# Patient Record
Sex: FEMALE | Race: OTHER | HISPANIC OR LATINO | URBAN - METROPOLITAN AREA
[De-identification: names, ages, dates, MRNs, and addresses within clinical notes are randomized per-mention and may not be internally consistent; named-entity substitution may affect disease eponyms.]

---

## 2019-05-23 PROBLEM — Z00.00 ENCOUNTER FOR PREVENTIVE HEALTH EXAMINATION: Status: ACTIVE | Noted: 2019-05-23

## 2019-10-31 ENCOUNTER — OUTPATIENT (OUTPATIENT)
Dept: OUTPATIENT SERVICES | Facility: HOSPITAL | Age: 39
LOS: 1 days | Discharge: HOME | End: 2019-10-31

## 2019-10-31 ENCOUNTER — LABORATORY RESULT (OUTPATIENT)
Age: 39
End: 2019-10-31

## 2019-10-31 ENCOUNTER — APPOINTMENT (OUTPATIENT)
Dept: UROGYNECOLOGY | Facility: CLINIC | Age: 39
End: 2019-10-31
Payer: COMMERCIAL

## 2019-10-31 VITALS
SYSTOLIC BLOOD PRESSURE: 133 MMHG | DIASTOLIC BLOOD PRESSURE: 83 MMHG | WEIGHT: 220 LBS | HEIGHT: 64 IN | HEART RATE: 80 BPM | BODY MASS INDEX: 37.56 KG/M2

## 2019-10-31 DIAGNOSIS — M79.10 MYALGIA, UNSPECIFIED SITE: ICD-10-CM

## 2019-10-31 DIAGNOSIS — R82.90 UNSPECIFIED ABNORMAL FINDINGS IN URINE: ICD-10-CM

## 2019-10-31 DIAGNOSIS — Z82.49 FAMILY HISTORY OF ISCHEMIC HEART DISEASE AND OTHER DISEASES OF THE CIRCULATORY SYSTEM: ICD-10-CM

## 2019-10-31 DIAGNOSIS — Z78.9 OTHER SPECIFIED HEALTH STATUS: ICD-10-CM

## 2019-10-31 DIAGNOSIS — Z83.79 FAMILY HISTORY OF OTHER DISEASES OF THE DIGESTIVE SYSTEM: ICD-10-CM

## 2019-10-31 DIAGNOSIS — N81.2 INCOMPLETE UTEROVAGINAL PROLAPSE: ICD-10-CM

## 2019-10-31 LAB
BILIRUB UR QL STRIP: NORMAL
CLARITY UR: CLEAR
COLLECTION METHOD: NORMAL
GLUCOSE UR-MCNC: NORMAL
HCG UR QL: NORMAL EU/DL
HGB UR QL STRIP.AUTO: 10
KETONES UR-MCNC: NORMAL
LEUKOCYTE ESTERASE UR QL STRIP: 25
NITRITE UR QL STRIP: NORMAL
PH UR STRIP: 6
PROT UR STRIP-MCNC: NORMAL
SP GR UR STRIP: 1.01

## 2019-10-31 PROCEDURE — 51701 INSERT BLADDER CATHETER: CPT

## 2019-10-31 PROCEDURE — 99244 OFF/OP CNSLTJ NEW/EST MOD 40: CPT | Mod: 25

## 2019-10-31 PROCEDURE — 81003 URINALYSIS AUTO W/O SCOPE: CPT | Mod: QW

## 2019-10-31 RX ORDER — CYCLOBENZAPRINE HYDROCHLORIDE 5 MG/1
5 TABLET, FILM COATED ORAL
Qty: 60 | Refills: 1 | Status: ACTIVE | COMMUNITY
Start: 2019-10-31 | End: 1900-01-01

## 2019-10-31 RX ORDER — PRENATAL WITH FERROUS FUM AND FOLIC ACID 3080; 920; 120; 400; 22; 1.84; 3; 20; 10; 1; 12; 200; 27; 25; 2 [IU]/1; [IU]/1; MG/1; [IU]/1; MG/1; MG/1; MG/1; MG/1; MG/1; MG/1; UG/1; MG/1; MG/1; MG/1; MG/1
27-1 TABLET ORAL
Qty: 30 | Refills: 0 | Status: ACTIVE | COMMUNITY
Start: 2019-10-14

## 2019-10-31 RX ORDER — COPPER 313.4 MG/1
INTRAUTERINE DEVICE INTRAUTERINE
Refills: 0 | Status: ACTIVE | COMMUNITY

## 2019-10-31 NOTE — HISTORY OF PRESENT ILLNESS
[FreeTextEntry1] : \par Pt with pelvic floor dysfunction here for urogynecologic evaluation. 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, etc\par \par Pelvic organ prolapse: s/p odessa, +bulge for 2 years, since her last delivery, not worsening, s/p kegels, denies splinting\par Stress urinary incontinence: none\par Overactive bladder syndrome: none\par Voiding dysfunction: No incomplete bladder emptying, no hesitancy \par Lower urinary tract/vaginal symptoms: 0 UTIs per year, no hematuria, no dysuria, no bladder pain \par Fecal incontinence: none\par Defecatory dysfunction: sausage \par Sexual dysfunction: Pain with penetration and at the opening for the last 6 months, incontinence with penetration x1\par Pelvic pain: none\par Vaginal dryness: no \par \par Her pelvic floor symptoms are significantly bothersome and negatively impacting her quality of life. \par \par

## 2019-10-31 NOTE — DISCUSSION/SUMMARY
[FreeTextEntry1] : \par Uterovaginal prolapse incomplete-\par The patient was counseled regarding the possible natural progression of prolapse and the clinical consequences of worsening prolapse. The stage and the location of the prolapse was reviewed with the patient. She was counseled regarding the management strategies including observation, pessary placement and surgery (robotic hysterectomy/BS/sacrocolpopexy). The patient voiced understanding and agrees with pessary management. The patient will be scheduled for a pessary fitting.\par \par Myalgia-\par Discussed the pathophysiology of the above condition. Reviewed management options including medications (oral or vaginal suppository), injections, pelvic floor physical therapy, or referral for possible pudendal nerve blocks. The patient agrees to a referral to PT and medical management. The risks and benefits of flexeril was reviewed.\par \par Abnormal urinalysis-\par Will send the urine for a culture to rule out infectious etiology. \par \par \par \par

## 2019-10-31 NOTE — COUNSELING
[FreeTextEntry1] : \par We will notify you of the urine results if they are abnormal\par \par Schedule pessary fitting with my PASean\par \par Please start taking the flexeril (muscle relaxant) twice a day for the pain. It can make you sleepy\par \par Please call my office if you have any issues with the cost or side effects of the medication.\par \par Schedule 6 week med check with my PASean (myalgia)\par \par Referral to pelvic floor PT\par [facility]\par [address] [place] [id number]\par \par \par [facility]\par [place], [place]\par \par

## 2019-11-04 DIAGNOSIS — R82.90 UNSPECIFIED ABNORMAL FINDINGS IN URINE: ICD-10-CM

## 2019-11-18 ENCOUNTER — APPOINTMENT (OUTPATIENT)
Dept: UROGYNECOLOGY | Facility: CLINIC | Age: 39
End: 2019-11-18

## 2019-12-12 ENCOUNTER — APPOINTMENT (OUTPATIENT)
Dept: UROGYNECOLOGY | Facility: CLINIC | Age: 39
End: 2019-12-12

## 2020-12-09 ENCOUNTER — APPOINTMENT (OUTPATIENT)
Dept: SURGERY | Facility: CLINIC | Age: 40
End: 2020-12-09
Payer: COMMERCIAL

## 2020-12-09 VITALS
WEIGHT: 285 LBS | BODY MASS INDEX: 52.44 KG/M2 | DIASTOLIC BLOOD PRESSURE: 70 MMHG | HEIGHT: 62 IN | SYSTOLIC BLOOD PRESSURE: 126 MMHG

## 2020-12-09 PROCEDURE — 99072 ADDL SUPL MATRL&STAF TM PHE: CPT

## 2020-12-09 PROCEDURE — 99204 OFFICE O/P NEW MOD 45 MIN: CPT

## 2020-12-17 NOTE — PHYSICAL EXAM
[Obese, well nourished, in no acute distress] : obese, well nourished, in no acute distress [Normal] : affect appropriate [de-identified] : soft, non-tender, no rebound/guarding, no masses/hernias, no LAD, well-healed laparoscopic scars [de-identified] : no CVA tenderness

## 2020-12-17 NOTE — ASSESSMENT
[FreeTextEntry1] : 41yo female with PMHx of asthma, pre-DM (uncertain of A1C), previous cholecystectomy, menorrhagia (resolved with IUD) and morbid obesity BMI 52.1 presenting for consultation of weight loss surgery/management.\par -discussed surgical options - gastric band, sleeve gastrectomy, yaw-en-Y gastric bypass\par -discussed potential surgical complications for each option - including bleeding, infection, pain, hernia, leak, stricture, and blood clots\par -discussed smoking of any kind (cigarettes, marijuana, e-cigarettes) is prohibited\par -discussed that pregnancy within 2 years is not recommended - patient has IUD, not planning on more children, has 15yo and 2yo sons\par -at this time, the patient is interested in SLEEVE GASTRECTOMY\par -I informed him that there may be findings on EGD that disqualify him from sleeve, particularly cordova's esophagus.\par -recommend high protein, low carb, low fat diet with protein shakes\par -encourage exercise regimen - starting with 10 minutes per day combining cardio and resistance training with the goal of 30 minutes of exercise 4 times per week\par -next step - bariatric education session at nearest convenience \par -pre-operative preparation - will include PCP evaluation, cardiac evaluation, pulmonary evaluation, EGD, nutritional evaluation (3 months), labs and US

## 2020-12-17 NOTE — HISTORY OF PRESENT ILLNESS
[de-identified] : 41yo female with PMHx of asthma, pre-DM (uncertain of A1C), previous cholecystectomy, menorrhagia (resolved with IUD) and morbid obesity BMI 52.1 presenting for consultation of weight loss surgery/management. Patient states she has struggled with her weight since having children. Previous weight loss attempts include various diets and exercise regimens with limited success. Patient denies shortness of breath. She reports lower back and knee pain, which she attributes to her weight. She states that she has occasional heartburn related to eating fatty foods, which is self-limited. Underwent EGD 4 years ago, which was negative. Underwent colonoscopy at that time for diverticulitis.

## 2020-12-17 NOTE — CONSULT LETTER
[Courtesy Letter:] : I had the pleasure of seeing your patient, [unfilled], in my office today. [Please see my note below.] : Please see my note below. [Sincerely,] : Sincerely, [Dear  ___] : Dear  [unfilled], [FreeTextEntry3] : Linda Hodge MD FACS\par Bariatric & Minimally Invasive Surgery\par [facility]\par [phone]

## 2021-01-06 ENCOUNTER — APPOINTMENT (OUTPATIENT)
Dept: SURGERY | Facility: CLINIC | Age: 41
End: 2021-01-06

## 2021-01-16 ENCOUNTER — OUTPATIENT (OUTPATIENT)
Dept: OUTPATIENT SERVICES | Facility: HOSPITAL | Age: 41
LOS: 1 days | Discharge: HOME | End: 2021-01-16

## 2021-01-16 DIAGNOSIS — F50.9 EATING DISORDER, UNSPECIFIED: ICD-10-CM

## 2021-01-21 DIAGNOSIS — F50.9 EATING DISORDER, UNSPECIFIED: ICD-10-CM

## 2021-02-08 ENCOUNTER — APPOINTMENT (OUTPATIENT)
Dept: SURGERY | Facility: CLINIC | Age: 41
End: 2021-02-08

## 2021-03-18 ENCOUNTER — APPOINTMENT (OUTPATIENT)
Dept: SURGERY | Facility: CLINIC | Age: 41
End: 2021-03-18
Payer: COMMERCIAL

## 2021-03-18 VITALS — BODY MASS INDEX: 48.4 KG/M2 | HEIGHT: 62 IN | WEIGHT: 263 LBS

## 2021-03-18 PROCEDURE — ZZZZZ: CPT

## 2021-03-24 DIAGNOSIS — E55.9 VITAMIN D DEFICIENCY, UNSPECIFIED: ICD-10-CM

## 2021-04-29 ENCOUNTER — APPOINTMENT (OUTPATIENT)
Dept: SURGERY | Facility: CLINIC | Age: 41
End: 2021-04-29
Payer: COMMERCIAL

## 2021-04-29 VITALS — WEIGHT: 258 LBS | BODY MASS INDEX: 47.48 KG/M2 | HEIGHT: 62 IN

## 2021-04-29 PROCEDURE — ZZZZZ: CPT

## 2021-05-10 ENCOUNTER — APPOINTMENT (OUTPATIENT)
Dept: SURGERY | Facility: CLINIC | Age: 41
End: 2021-05-10
Payer: COMMERCIAL

## 2021-05-10 VITALS — BODY MASS INDEX: 47.48 KG/M2 | WEIGHT: 258 LBS | HEIGHT: 62 IN

## 2021-05-10 PROCEDURE — ZZZZZ: CPT

## 2021-06-21 ENCOUNTER — APPOINTMENT (OUTPATIENT)
Dept: SURGERY | Facility: CLINIC | Age: 41
End: 2021-06-21
Payer: COMMERCIAL

## 2021-06-21 VITALS — HEIGHT: 62 IN | BODY MASS INDEX: 46.38 KG/M2 | WEIGHT: 252 LBS

## 2021-06-21 PROCEDURE — ZZZZZ: CPT

## 2021-11-04 ENCOUNTER — NON-APPOINTMENT (OUTPATIENT)
Age: 41
End: 2021-11-04

## 2021-12-08 ENCOUNTER — APPOINTMENT (OUTPATIENT)
Dept: NEUROPSYCHOLOGY | Facility: CLINIC | Age: 41
End: 2021-12-08

## 2021-12-08 ENCOUNTER — OUTPATIENT (OUTPATIENT)
Dept: OUTPATIENT SERVICES | Facility: HOSPITAL | Age: 41
LOS: 1 days | Discharge: HOME | End: 2021-12-08

## 2021-12-08 DIAGNOSIS — F50.9 EATING DISORDER, UNSPECIFIED: ICD-10-CM

## 2021-12-14 DIAGNOSIS — F50.9 EATING DISORDER, UNSPECIFIED: ICD-10-CM

## 2021-12-20 ENCOUNTER — NON-APPOINTMENT (OUTPATIENT)
Age: 41
End: 2021-12-20

## 2022-01-11 ENCOUNTER — NON-APPOINTMENT (OUTPATIENT)
Age: 42
End: 2022-01-11

## 2022-01-11 RX ORDER — NORETHINDRONE ACETATE 5 MG/1
5 TABLET ORAL
Qty: 20 | Refills: 0 | Status: DISCONTINUED | COMMUNITY
Start: 2020-12-04 | End: 2022-01-11

## 2022-02-01 ENCOUNTER — OUTPATIENT (OUTPATIENT)
Dept: OUTPATIENT SERVICES | Facility: HOSPITAL | Age: 42
LOS: 1 days | Discharge: HOME | End: 2022-02-01
Payer: COMMERCIAL

## 2022-02-01 VITALS
WEIGHT: 282.19 LBS | SYSTOLIC BLOOD PRESSURE: 138 MMHG | HEIGHT: 63 IN | DIASTOLIC BLOOD PRESSURE: 69 MMHG | TEMPERATURE: 98 F | RESPIRATION RATE: 18 BRPM | OXYGEN SATURATION: 99 % | HEART RATE: 82 BPM

## 2022-02-01 DIAGNOSIS — Z01.818 ENCOUNTER FOR OTHER PREPROCEDURAL EXAMINATION: ICD-10-CM

## 2022-02-01 DIAGNOSIS — Z98.890 OTHER SPECIFIED POSTPROCEDURAL STATES: Chronic | ICD-10-CM

## 2022-02-01 DIAGNOSIS — E66.01 MORBID (SEVERE) OBESITY DUE TO EXCESS CALORIES: ICD-10-CM

## 2022-02-01 LAB
ALBUMIN SERPL ELPH-MCNC: 4.1 G/DL — SIGNIFICANT CHANGE UP (ref 3.5–5.2)
ALP SERPL-CCNC: 116 U/L — HIGH (ref 30–115)
ALT FLD-CCNC: 17 U/L — SIGNIFICANT CHANGE UP (ref 0–41)
ANION GAP SERPL CALC-SCNC: 14 MMOL/L — SIGNIFICANT CHANGE UP (ref 7–14)
APTT BLD: 37.7 SEC — SIGNIFICANT CHANGE UP (ref 27–39.2)
AST SERPL-CCNC: 15 U/L — SIGNIFICANT CHANGE UP (ref 0–41)
BASOPHILS # BLD AUTO: 0.03 K/UL — SIGNIFICANT CHANGE UP (ref 0–0.2)
BASOPHILS NFR BLD AUTO: 0.3 % — SIGNIFICANT CHANGE UP (ref 0–1)
BILIRUB SERPL-MCNC: <0.2 MG/DL — SIGNIFICANT CHANGE UP (ref 0.2–1.2)
BLD GP AB SCN SERPL QL: SIGNIFICANT CHANGE UP
BUN SERPL-MCNC: 15 MG/DL — SIGNIFICANT CHANGE UP (ref 10–20)
CALCIUM SERPL-MCNC: 9.3 MG/DL — SIGNIFICANT CHANGE UP (ref 8.5–10.1)
CHLORIDE SERPL-SCNC: 102 MMOL/L — SIGNIFICANT CHANGE UP (ref 98–110)
CO2 SERPL-SCNC: 22 MMOL/L — SIGNIFICANT CHANGE UP (ref 17–32)
CREAT SERPL-MCNC: 0.7 MG/DL — SIGNIFICANT CHANGE UP (ref 0.7–1.5)
EOSINOPHIL # BLD AUTO: 0.18 K/UL — SIGNIFICANT CHANGE UP (ref 0–0.7)
EOSINOPHIL NFR BLD AUTO: 1.8 % — SIGNIFICANT CHANGE UP (ref 0–8)
GLUCOSE SERPL-MCNC: 78 MG/DL — SIGNIFICANT CHANGE UP (ref 70–99)
HCT VFR BLD CALC: 41.8 % — SIGNIFICANT CHANGE UP (ref 37–47)
HGB BLD-MCNC: 13.2 G/DL — SIGNIFICANT CHANGE UP (ref 12–16)
IMM GRANULOCYTES NFR BLD AUTO: 0.3 % — SIGNIFICANT CHANGE UP (ref 0.1–0.3)
INR BLD: 1.1 RATIO — SIGNIFICANT CHANGE UP (ref 0.65–1.3)
LYMPHOCYTES # BLD AUTO: 2.65 K/UL — SIGNIFICANT CHANGE UP (ref 1.2–3.4)
LYMPHOCYTES # BLD AUTO: 26.3 % — SIGNIFICANT CHANGE UP (ref 20.5–51.1)
MCHC RBC-ENTMCNC: 27.2 PG — SIGNIFICANT CHANGE UP (ref 27–31)
MCHC RBC-ENTMCNC: 31.6 G/DL — LOW (ref 32–37)
MCV RBC AUTO: 86.2 FL — SIGNIFICANT CHANGE UP (ref 81–99)
MONOCYTES # BLD AUTO: 0.71 K/UL — HIGH (ref 0.1–0.6)
MONOCYTES NFR BLD AUTO: 7.1 % — SIGNIFICANT CHANGE UP (ref 1.7–9.3)
NEUTROPHILS # BLD AUTO: 6.46 K/UL — SIGNIFICANT CHANGE UP (ref 1.4–6.5)
NEUTROPHILS NFR BLD AUTO: 64.2 % — SIGNIFICANT CHANGE UP (ref 42.2–75.2)
NRBC # BLD: 0 /100 WBCS — SIGNIFICANT CHANGE UP (ref 0–0)
PLATELET # BLD AUTO: 303 K/UL — SIGNIFICANT CHANGE UP (ref 130–400)
POTASSIUM SERPL-MCNC: 4.4 MMOL/L — SIGNIFICANT CHANGE UP (ref 3.5–5)
POTASSIUM SERPL-SCNC: 4.4 MMOL/L — SIGNIFICANT CHANGE UP (ref 3.5–5)
PROT SERPL-MCNC: 6.9 G/DL — SIGNIFICANT CHANGE UP (ref 6–8)
PROTHROM AB SERPL-ACNC: 12.6 SEC — SIGNIFICANT CHANGE UP (ref 9.95–12.87)
RBC # BLD: 4.85 M/UL — SIGNIFICANT CHANGE UP (ref 4.2–5.4)
RBC # FLD: 14.6 % — HIGH (ref 11.5–14.5)
SODIUM SERPL-SCNC: 138 MMOL/L — SIGNIFICANT CHANGE UP (ref 135–146)
WBC # BLD: 10.06 K/UL — SIGNIFICANT CHANGE UP (ref 4.8–10.8)
WBC # FLD AUTO: 10.06 K/UL — SIGNIFICANT CHANGE UP (ref 4.8–10.8)

## 2022-02-01 PROCEDURE — 93010 ELECTROCARDIOGRAM REPORT: CPT

## 2022-02-01 NOTE — H&P PST ADULT - HISTORY OF PRESENT ILLNESS
Patient is a 41 year old female presenting to PAST in preparation for Laparoscopic sleeve gastrectomy, possible open, possible intraoperative endoscopy and all indicated procedures- admission to ICU/SDU  on 2/22 under gen anesthesia by Dr. Hodge  pt is having procedure electively  PATIENT CURRENTLY DENIES CHEST PAIN  SHORTNESS OF BREATH  PALPITATIONS,  DYSURIA, OR UPPER RESPIRATORY INFECTION IN PAST 2 WEEKS  EXERCISE  TOLERANCE  1-2 FLIGHT OF STAIRS  WITHOUT SHORTNESS OF BREATH    Anesthesia Alert  NO--Difficult Airway  NO--History of neck surgery or radiation  NO--Limited ROM of neck  NO--History of Malignant hyperthermia  NO--Personal or family history of Pseudocholinesterase deficiency  NO--Prior Anesthesia Complication  NO--Latex Allergy  NO--Loose teeth  NO--History of Rheumatoid Arthritis  yes--NETTIE ( does not have c-pap)  NO-- BLEEDING RISK  NO--Other_____    Patient denies any signs or symptoms of COVID 19 and denies contact with known positive individuals.  They have an appointment for  COVID testing pre-procedure and acknowledge its time and place.  They were instructed to quarantine pre-procedure, practice exposure control measures, continue to self-monitor and report any concerns to their proceduralist.    As per patient, this is their complete medical and surgical history, including medications both prescribed or over the counter.  Patient verbalized understanding of instructions and was given the opportunity to ask questions and have them answered.

## 2022-02-02 LAB
A1C WITH ESTIMATED AVERAGE GLUCOSE RESULT: 6.1 % — HIGH (ref 4–5.6)
ESTIMATED AVERAGE GLUCOSE: 128 MG/DL — HIGH (ref 68–114)

## 2022-02-15 RX ORDER — OMEPRAZOLE 40 MG/1
40 CAPSULE, DELAYED RELEASE ORAL
Qty: 30 | Refills: 2 | Status: COMPLETED | COMMUNITY
Start: 2022-02-15 | End: 2022-05-23

## 2022-02-15 RX ORDER — ERGOCALCIFEROL 1.25 MG/1
1.25 MG CAPSULE, LIQUID FILLED ORAL
Qty: 4 | Refills: 3 | Status: COMPLETED | COMMUNITY
Start: 2021-03-24 | End: 2022-02-15

## 2022-02-16 ENCOUNTER — APPOINTMENT (OUTPATIENT)
Dept: SURGERY | Facility: CLINIC | Age: 42
End: 2022-02-16
Payer: COMMERCIAL

## 2022-02-16 VITALS
TEMPERATURE: 97.2 F | HEIGHT: 62 IN | OXYGEN SATURATION: 98 % | SYSTOLIC BLOOD PRESSURE: 110 MMHG | WEIGHT: 278 LBS | HEART RATE: 86 BPM | DIASTOLIC BLOOD PRESSURE: 80 MMHG | BODY MASS INDEX: 51.16 KG/M2

## 2022-02-16 DIAGNOSIS — J45.909 UNSPECIFIED ASTHMA, UNCOMPLICATED: ICD-10-CM

## 2022-02-16 PROCEDURE — 99242 OFF/OP CONSLTJ NEW/EST SF 20: CPT

## 2022-02-17 NOTE — PLAN
[FreeTextEntry1] : Plan: Proceed with surgery on 2/22/222.\par          RTO for postoperative visit on 3/2/2022.\par          Call with concerns.\par

## 2022-02-17 NOTE — HISTORY OF PRESENT ILLNESS
[de-identified] : BETSY LUCERO  underwent extensive preoperative workup and is scheduled to have Robotic/Laparoscopic Sleeve Gastrectomy on 2/22/2022. Patient will benefit from surgery to improve her quality of life and for management of her comorbid conditions.\par At this preoperative visit, we discussed the risks, benefits and alternatives to weight loss surgery. We specifically discussed the risks of anesthesia including cardiac and pulmonary complications, DVT/PE, pneumonia, leaks, infection, death, bleeding, ulcers, and need for additional operations have been reviewed. We discussed the importance of a consistent diet and exercise regimen in regards to weight loss and maintenance as well. The importance of lifelong mineral and vitamin supplementation was also reviewed with the patient. The patient was given ample opportunity to ask questions and all questions were answered\par

## 2022-02-17 NOTE — ADDENDUM
[FreeTextEntry1] : I have seen and examined the patient and participated in patient care. Patient is preparing for robotic sleeve gastrectomy 2/22/22.

## 2022-02-17 NOTE — ASSESSMENT
[FreeTextEntry1] : BETSY LUCERO is a 41 year female seen today for Preoperative Bariatric visit. She was prescribed Lovenox 40 mg SQ daily prophylactically for 1 month.She was instructed in the administration of the medication with good return demonstration.  All medications were reviewed with patient and instructions were given in respect to medications to take on the day of surgery as well as postoperatively.  Written instructions and materials were provided.  All questions were answered.\par

## 2022-02-18 ENCOUNTER — NON-APPOINTMENT (OUTPATIENT)
Age: 42
End: 2022-02-18

## 2022-02-19 ENCOUNTER — LABORATORY RESULT (OUTPATIENT)
Age: 42
End: 2022-02-19

## 2022-02-22 ENCOUNTER — APPOINTMENT (OUTPATIENT)
Dept: SURGERY | Facility: HOSPITAL | Age: 42
End: 2022-02-22

## 2022-02-22 ENCOUNTER — RESULT REVIEW (OUTPATIENT)
Age: 42
End: 2022-02-22

## 2022-02-22 ENCOUNTER — INPATIENT (INPATIENT)
Facility: HOSPITAL | Age: 42
LOS: 0 days | Discharge: HOME | End: 2022-02-23
Attending: STUDENT IN AN ORGANIZED HEALTH CARE EDUCATION/TRAINING PROGRAM | Admitting: STUDENT IN AN ORGANIZED HEALTH CARE EDUCATION/TRAINING PROGRAM
Payer: COMMERCIAL

## 2022-02-22 VITALS
SYSTOLIC BLOOD PRESSURE: 123 MMHG | DIASTOLIC BLOOD PRESSURE: 71 MMHG | WEIGHT: 278 LBS | TEMPERATURE: 98 F | RESPIRATION RATE: 16 BRPM | HEIGHT: 63 IN | OXYGEN SATURATION: 96 % | HEART RATE: 87 BPM

## 2022-02-22 DIAGNOSIS — Z98.890 OTHER SPECIFIED POSTPROCEDURAL STATES: Chronic | ICD-10-CM

## 2022-02-22 LAB
ANION GAP SERPL CALC-SCNC: 15 MMOL/L — HIGH (ref 7–14)
BASOPHILS # BLD AUTO: 0.02 K/UL — SIGNIFICANT CHANGE UP (ref 0–0.2)
BASOPHILS NFR BLD AUTO: 0.2 % — SIGNIFICANT CHANGE UP (ref 0–1)
BUN SERPL-MCNC: 10 MG/DL — SIGNIFICANT CHANGE UP (ref 10–20)
CALCIUM SERPL-MCNC: 8.3 MG/DL — LOW (ref 8.5–10.1)
CHLORIDE SERPL-SCNC: 102 MMOL/L — SIGNIFICANT CHANGE UP (ref 98–110)
CO2 SERPL-SCNC: 20 MMOL/L — SIGNIFICANT CHANGE UP (ref 17–32)
CREAT SERPL-MCNC: 0.7 MG/DL — SIGNIFICANT CHANGE UP (ref 0.7–1.5)
EOSINOPHIL # BLD AUTO: 0.01 K/UL — SIGNIFICANT CHANGE UP (ref 0–0.7)
EOSINOPHIL NFR BLD AUTO: 0.1 % — SIGNIFICANT CHANGE UP (ref 0–8)
GLUCOSE SERPL-MCNC: 146 MG/DL — HIGH (ref 70–99)
HCT VFR BLD CALC: 42.7 % — SIGNIFICANT CHANGE UP (ref 37–47)
HGB BLD-MCNC: 13.8 G/DL — SIGNIFICANT CHANGE UP (ref 12–16)
IMM GRANULOCYTES NFR BLD AUTO: 0.4 % — HIGH (ref 0.1–0.3)
LYMPHOCYTES # BLD AUTO: 1.01 K/UL — LOW (ref 1.2–3.4)
LYMPHOCYTES # BLD AUTO: 7.6 % — LOW (ref 20.5–51.1)
MAGNESIUM SERPL-MCNC: 1.7 MG/DL — LOW (ref 1.8–2.4)
MCHC RBC-ENTMCNC: 27.6 PG — SIGNIFICANT CHANGE UP (ref 27–31)
MCHC RBC-ENTMCNC: 32.3 G/DL — SIGNIFICANT CHANGE UP (ref 32–37)
MCV RBC AUTO: 85.4 FL — SIGNIFICANT CHANGE UP (ref 81–99)
MONOCYTES # BLD AUTO: 0.18 K/UL — SIGNIFICANT CHANGE UP (ref 0.1–0.6)
MONOCYTES NFR BLD AUTO: 1.4 % — LOW (ref 1.7–9.3)
NEUTROPHILS # BLD AUTO: 12.01 K/UL — HIGH (ref 1.4–6.5)
NEUTROPHILS NFR BLD AUTO: 90.3 % — HIGH (ref 42.2–75.2)
NRBC # BLD: 0 /100 WBCS — SIGNIFICANT CHANGE UP (ref 0–0)
PHOSPHATE SERPL-MCNC: 2.9 MG/DL — SIGNIFICANT CHANGE UP (ref 2.1–4.9)
PLATELET # BLD AUTO: 282 K/UL — SIGNIFICANT CHANGE UP (ref 130–400)
POTASSIUM SERPL-MCNC: 4.2 MMOL/L — SIGNIFICANT CHANGE UP (ref 3.5–5)
POTASSIUM SERPL-SCNC: 4.2 MMOL/L — SIGNIFICANT CHANGE UP (ref 3.5–5)
RBC # BLD: 5 M/UL — SIGNIFICANT CHANGE UP (ref 4.2–5.4)
RBC # FLD: 14.7 % — HIGH (ref 11.5–14.5)
SODIUM SERPL-SCNC: 137 MMOL/L — SIGNIFICANT CHANGE UP (ref 135–146)
WBC # BLD: 13.28 K/UL — HIGH (ref 4.8–10.8)
WBC # FLD AUTO: 13.28 K/UL — HIGH (ref 4.8–10.8)

## 2022-02-22 PROCEDURE — 43775 LAP SLEEVE GASTRECTOMY: CPT

## 2022-02-22 PROCEDURE — 88305 TISSUE EXAM BY PATHOLOGIST: CPT | Mod: 26

## 2022-02-22 PROCEDURE — 93010 ELECTROCARDIOGRAM REPORT: CPT

## 2022-02-22 RX ORDER — ONDANSETRON 8 MG/1
4 TABLET, FILM COATED ORAL EVERY 6 HOURS
Refills: 0 | Status: DISCONTINUED | OUTPATIENT
Start: 2022-02-22 | End: 2022-02-23

## 2022-02-22 RX ORDER — ACETAMINOPHEN 500 MG
1000 TABLET ORAL ONCE
Refills: 0 | Status: COMPLETED | OUTPATIENT
Start: 2022-02-22 | End: 2022-02-22

## 2022-02-22 RX ORDER — SODIUM CHLORIDE 9 MG/ML
1000 INJECTION, SOLUTION INTRAVENOUS
Refills: 0 | Status: DISCONTINUED | OUTPATIENT
Start: 2022-02-22 | End: 2022-02-22

## 2022-02-22 RX ORDER — PANTOPRAZOLE SODIUM 20 MG/1
40 TABLET, DELAYED RELEASE ORAL DAILY
Refills: 0 | Status: DISCONTINUED | OUTPATIENT
Start: 2022-02-23 | End: 2022-02-23

## 2022-02-22 RX ORDER — ONDANSETRON 8 MG/1
4 TABLET, FILM COATED ORAL ONCE
Refills: 0 | Status: COMPLETED | OUTPATIENT
Start: 2022-02-22 | End: 2022-02-22

## 2022-02-22 RX ORDER — HYDROMORPHONE HYDROCHLORIDE 2 MG/ML
1 INJECTION INTRAMUSCULAR; INTRAVENOUS; SUBCUTANEOUS
Refills: 0 | Status: DISCONTINUED | OUTPATIENT
Start: 2022-02-22 | End: 2022-02-22

## 2022-02-22 RX ORDER — ENOXAPARIN SODIUM 100 MG/ML
40 INJECTION SUBCUTANEOUS ONCE
Refills: 0 | Status: COMPLETED | OUTPATIENT
Start: 2022-02-22 | End: 2022-02-22

## 2022-02-22 RX ORDER — GABAPENTIN 400 MG/1
100 CAPSULE ORAL THREE TIMES A DAY
Refills: 0 | Status: DISCONTINUED | OUTPATIENT
Start: 2022-02-22 | End: 2022-02-23

## 2022-02-22 RX ORDER — SCOPALAMINE 1 MG/3D
1 PATCH, EXTENDED RELEASE TRANSDERMAL ONCE
Refills: 0 | Status: COMPLETED | OUTPATIENT
Start: 2022-02-22 | End: 2022-02-22

## 2022-02-22 RX ORDER — ENOXAPARIN SODIUM 100 MG/ML
40 INJECTION SUBCUTANEOUS EVERY 12 HOURS
Refills: 0 | Status: DISCONTINUED | OUTPATIENT
Start: 2022-02-23 | End: 2022-02-23

## 2022-02-22 RX ORDER — BUPIVACAINE 13.3 MG/ML
20 INJECTION, SUSPENSION, LIPOSOMAL INFILTRATION ONCE
Refills: 0 | Status: DISCONTINUED | OUTPATIENT
Start: 2022-02-22 | End: 2022-02-22

## 2022-02-22 RX ORDER — KETOROLAC TROMETHAMINE 30 MG/ML
15 SYRINGE (ML) INJECTION EVERY 8 HOURS
Refills: 0 | Status: DISCONTINUED | OUTPATIENT
Start: 2022-02-22 | End: 2022-02-23

## 2022-02-22 RX ORDER — SODIUM CHLORIDE 9 MG/ML
1000 INJECTION, SOLUTION INTRAVENOUS
Refills: 0 | Status: DISCONTINUED | OUTPATIENT
Start: 2022-02-22 | End: 2022-02-23

## 2022-02-22 RX ORDER — DIPHENHYDRAMINE HCL 50 MG
12.5 CAPSULE ORAL ONCE
Refills: 0 | Status: COMPLETED | OUTPATIENT
Start: 2022-02-22 | End: 2022-02-22

## 2022-02-22 RX ORDER — MAGNESIUM SULFATE 500 MG/ML
2 VIAL (ML) INJECTION ONCE
Refills: 0 | Status: COMPLETED | OUTPATIENT
Start: 2022-02-22 | End: 2022-02-22

## 2022-02-22 RX ORDER — ACETAMINOPHEN 500 MG
1000 TABLET ORAL ONCE
Refills: 0 | Status: DISCONTINUED | OUTPATIENT
Start: 2022-02-22 | End: 2022-02-22

## 2022-02-22 RX ADMIN — SODIUM CHLORIDE 125 MILLILITER(S): 9 INJECTION, SOLUTION INTRAVENOUS at 18:27

## 2022-02-22 RX ADMIN — HYDROMORPHONE HYDROCHLORIDE 1 MILLIGRAM(S): 2 INJECTION INTRAMUSCULAR; INTRAVENOUS; SUBCUTANEOUS at 17:37

## 2022-02-22 RX ADMIN — SODIUM CHLORIDE 75 MILLILITER(S): 9 INJECTION, SOLUTION INTRAVENOUS at 17:46

## 2022-02-22 RX ADMIN — Medication 400 MILLIGRAM(S): at 22:23

## 2022-02-22 RX ADMIN — SCOPALAMINE 1 PATCH: 1 PATCH, EXTENDED RELEASE TRANSDERMAL at 18:26

## 2022-02-22 RX ADMIN — Medication 15 MILLIGRAM(S): at 19:44

## 2022-02-22 RX ADMIN — ENOXAPARIN SODIUM 40 MILLIGRAM(S): 100 INJECTION SUBCUTANEOUS at 13:34

## 2022-02-22 RX ADMIN — Medication 25 GRAM(S): at 23:01

## 2022-02-22 RX ADMIN — Medication 12.5 MILLIGRAM(S): at 18:39

## 2022-02-22 RX ADMIN — ONDANSETRON 4 MILLIGRAM(S): 8 TABLET, FILM COATED ORAL at 17:42

## 2022-02-22 RX ADMIN — SCOPALAMINE 1 PATCH: 1 PATCH, EXTENDED RELEASE TRANSDERMAL at 13:34

## 2022-02-22 RX ADMIN — Medication 15 MILLIGRAM(S): at 19:00

## 2022-02-22 RX ADMIN — Medication 1000 MILLIGRAM(S): at 22:30

## 2022-02-22 RX ADMIN — HYDROMORPHONE HYDROCHLORIDE 1 MILLIGRAM(S): 2 INJECTION INTRAMUSCULAR; INTRAVENOUS; SUBCUTANEOUS at 18:08

## 2022-02-22 NOTE — CHART NOTE - NSCHARTNOTEFT_GEN_A_CORE
Post Operative Note  Patient: BETSY LUCERO 41y (1980) Female   MRN: [id number]  Location: [address]  Visit: 02-22-22 Inpatient  Date: 02-22-22 @ 22:06    Procedure: Class 3 severe obesity in adult     S/P Robot-assisted sleeve gastrectomy    Block, transversus abdominis plane, bilateral        Subjective: Patient having moderate discomfort following surgery, denies nausea/vomiting, has voided, ambulating, no gas or BM, on 1 cup of cruzito clears.      Objective:  Vitals: T(F): 98 (02-22-22 @ 20:15), Max: 98.6 (02-22-22 @ 17:28)  HR: 95 (02-22-22 @ 20:15)  BP: 133/69 (02-22-22 @ 20:15) (123/71 - 151/91)  RR: 16 (02-22-22 @ 20:15)  SpO2: 98% (02-22-22 @ 20:15)  Vent Settings:     In:   02-22-22 @ 07:01  -  02-22-22 @ 22:06  --------------------------------------------------------  IN: 525 mL      IV Fluids: lactated ringers. 1000 milliLiter(s) (125 mL/Hr) IV Continuous <Continuous>      Out:   02-22-22 @ 07:01  -  02-22-22 @ 22:06  --------------------------------------------------------  OUT: 350 mL      EBL:     Voided Urine:   02-22-22 @ 07:01  -  02-22-22 @ 22:06  --------------------------------------------------------  OUT: 350 mL      Carty Catheter: yes no   Drains:   GOOD:    ,   Chest Tube:      NG Tube:       Physical Examination:  General Appearance: NAD  HEENT: EOMI, sclera non-icteric.  Heart: RRR  Lungs: CTABL  Abdomen:  Soft, moderately tender, appropriate for post-op course, nondistended. No rigidity, guarding, or rebound tenderness.   MSK/Extremities: Warm & well-perfused. Peripheral pulses intact.  Skin: Warm, dry. No jaundice.   Incisions/Wounds: no signs of infection/active bleeding/drainage    Medications: [Standing]  acetaminophen   IVPB .. 1000 milliGRAM(s) IV Intermittent once PRN  gabapentin Solution 100 milliGRAM(s) Oral three times a day PRN  ketorolac   Injectable 15 milliGRAM(s) IV Push every 8 hours PRN  lactated ringers. 1000 milliLiter(s) IV Continuous <Continuous>  ondansetron Injectable 4 milliGRAM(s) IV Push every 6 hours PRN    Medications: [PRN]  acetaminophen   IVPB .. 1000 milliGRAM(s) IV Intermittent once PRN  gabapentin Solution 100 milliGRAM(s) Oral three times a day PRN  ketorolac   Injectable 15 milliGRAM(s) IV Push every 8 hours PRN  lactated ringers. 1000 milliLiter(s) IV Continuous <Continuous>  ondansetron Injectable 4 milliGRAM(s) IV Push every 6 hours PRN      DVT PROPHYLAXIS:   GI PROPHYLAXIS:   ANTICOAGULATION:   ANTIBIOTICS:        Labs:                        13.8   13.28 )-----------( 282      ( 22 Feb 2022 19:37 )             42.7     02-22    137  |  102  |  10  ----------------------------<  146<H>  4.2   |  20  |  0.7    Ca    8.3<L>      22 Feb 2022 19:37  Phos  2.9     02-22  Mg     1.7     02-22              Imaging:  No post-op imaging studies    Assessment:  41yF s/p robotic assisted gastric sleeve.    Plan    - monitor for tachycardia  - Cruzito clears  - Encourage ambulation  - Monitor urine output  - Lovenox BID  - Incentive spirometry  - GI PPX    Date/Time: 02-22-22 @ 22:06

## 2022-02-22 NOTE — BRIEF OPERATIVE NOTE - NSICDXBRIEFPROCEDURE_GEN_ALL_CORE_FT
PROCEDURES:  Robot-assisted sleeve gastrectomy 22-Feb-2022 17:29:38  Evelyn Peña, transversus abdominis plane, bilateral 22-Feb-2022 17:29:44  Evelyn Peña

## 2022-02-22 NOTE — CONSULT NOTE ADULT - ASSESSMENT
Assessment & Plan    41y Female  s/p Robotic assisted sleeve gastrectomy no 2/22/2022    NEURO:    Acute pain-controlled with IV tylenol, gabapentin, Toradol, Dilaudid PRN    RESP:     Oxygen insufficiency-wean off NC to RA as tolerate    Activity- Amublate as tolerated      CARDS:   EKG reviewed      GI/NUTR:     Diet, Bariatric Clears  LR @ 125            /RENAL:       Strict I/O-    Labs:          BUN/Cr-          Electrolytes- Replete PRN                 HEME/ONC:       DVT prophylaxis- LVX BID      ID:  Periop Cefotetan        ENDO:  No active issues    LINES/DRAINS:  PIVs    DISPO:    SDU

## 2022-02-22 NOTE — CONSULT NOTE ADULT - SUBJECTIVE AND OBJECTIVE BOX
SICU Consultation Note  ======================================================================================================  BETSY LUCERO  MRN-[id number]      41y Female  with BMI of 50 presents to the PACU following Robotic Assisted sleeve gastrectomy. Case was reportedly uneventful, patient did not require vasopressor support throughout the case, and Leak test was negative. Patient denies previous past medical history, except for NETTIE, which she does not use CPAP at home for. Currently, she is drowsy and is in a moderate amount of pain, although pain is controlled with PRN medications.       Procedure: Robotic Assisted Sleeve Gastrectomy  OR Stats  OR Time:     1hr 45 min          EBL:  5        IV Fluids: 900      Blood Products: none           Findings- negative leak test    PMH  PAST MEDICAL & SURGICAL HISTORY:  NETTIE (obstructive sleep apnea)    Obesity    History of surgery  lap odessa  d&amp;c          Home Meds:  Home Medications:         Allergies  Allergies    penicillins (Rash)    Intolerances         Current Medications:  acetaminophen   IVPB .. 1000 milliGRAM(s) IV Intermittent once PRN Moderate Pain (4 - 6)  gabapentin Solution 100 milliGRAM(s) Oral three times a day PRN pain  HYDROmorphone  Injectable 1 milliGRAM(s) IV Push every 10 minutes PRN Severe Pain (7 - 10)  ketorolac   Injectable 15 milliGRAM(s) IV Push every 8 hours PRN Moderate Pain (4 - 6)  lactated ringers. 1000 milliLiter(s) (125 mL/Hr) IV Continuous <Continuous>  lactated ringers. 1000 milliLiter(s) (75 mL/Hr) IV Continuous <Continuous>  ondansetron Injectable 4 milliGRAM(s) IV Push every 6 hours PRN Nausea      Advanced Directives: Presumed Full Code    VITAL SIGNS, INS/OUTS (Last 24hours):    ICU Vital Signs Last 24 Hrs  T(C): 37 (22 Feb 2022 17:28), Max: 37 (22 Feb 2022 17:28)  T(F): 98.6 (22 Feb 2022 17:28), Max: 98.6 (22 Feb 2022 17:28)  HR: 74 (22 Feb 2022 18:00) (74 - 89)  BP: 135/80 (22 Feb 2022 18:00) (123/71 - 135/80)  BP(mean): --  ABP: --  ABP(mean): --  RR: 20 (22 Feb 2022 18:00) (16 - 27)  SpO2: 96% (22 Feb 2022 18:00) (95% - 97%)    I&O's Summary      Height (cm): 160 (02-22-22)  Weight (kg): 126.1 (02-22-22)  BMI (kg/m2): 49.3 (02-22-22)  BSA (m2): 2.22 (02-22-22)    Physical Exam:   ---------------------------------------------------------------------------------------  GCS:      Exam: A&Ox3, no focal deficits    RESPIRATORY:  Normal expansion/effort on 2L NC    CARDIOVASCULAR:   S1/S2.  RRR  No peripheral edema    GASTROINTESTINAL:  Abdomen soft, non-tender, non-distended    MUSCULOSKELETAL:  Extremities warm, pink, well-perfused.    DERM:  No skin breakdown     :   Exam: no garg       Tubes/Lines/Drains   ----------------------------------------------------------------------------------------------------------      LABS  --------------------------------------------------------------------------------------  LFTs      Cardiac Markers        Coagulation      Arterial Blood Gas      Blood Sugar  CAPILLARY BLOOD GLUCOSE          Urinalysis      Cultures          CT/XRAY/ECHO/TCD/EEG  ----------------------------------------------------------------------------------------------            --------------------------------------------------------------------------------------  Admit Diagnosis: [id number]

## 2022-02-23 ENCOUNTER — TRANSCRIPTION ENCOUNTER (OUTPATIENT)
Age: 42
End: 2022-02-23

## 2022-02-23 VITALS
RESPIRATION RATE: 18 BRPM | SYSTOLIC BLOOD PRESSURE: 102 MMHG | DIASTOLIC BLOOD PRESSURE: 77 MMHG | TEMPERATURE: 98 F | OXYGEN SATURATION: 98 % | HEART RATE: 106 BPM

## 2022-02-23 PROCEDURE — 99024 POSTOP FOLLOW-UP VISIT: CPT

## 2022-02-23 PROCEDURE — 99024 POSTOP FOLLOW-UP VISIT: CPT | Mod: NC

## 2022-02-23 RX ADMIN — SCOPALAMINE 1 PATCH: 1 PATCH, EXTENDED RELEASE TRANSDERMAL at 07:30

## 2022-02-23 RX ADMIN — GABAPENTIN 100 MILLIGRAM(S): 400 CAPSULE ORAL at 01:41

## 2022-02-23 RX ADMIN — PANTOPRAZOLE SODIUM 40 MILLIGRAM(S): 20 TABLET, DELAYED RELEASE ORAL at 12:20

## 2022-02-23 RX ADMIN — Medication 15 MILLIGRAM(S): at 07:00

## 2022-02-23 RX ADMIN — Medication 15 MILLIGRAM(S): at 06:39

## 2022-02-23 RX ADMIN — Medication 62.5 MILLIMOLE(S): at 00:38

## 2022-02-23 RX ADMIN — ENOXAPARIN SODIUM 40 MILLIGRAM(S): 100 INJECTION SUBCUTANEOUS at 12:20

## 2022-02-23 RX ADMIN — ENOXAPARIN SODIUM 40 MILLIGRAM(S): 100 INJECTION SUBCUTANEOUS at 00:44

## 2022-02-23 NOTE — DISCHARGE NOTE NURSING/CASE MANAGEMENT/SOCIAL WORK - PATIENT PORTAL LINK FT
You can access the FollowMyHealth Patient Portal offered by [facility] by registering at the following website: [email or web address]. By joining "[employer]’s FollowMyHealth portal, you will also be able to view your health information using other applications (apps) compatible with our system.

## 2022-02-23 NOTE — DISCHARGE NOTE NURSING/CASE MANAGEMENT/SOCIAL WORK - NSDCPEFALRISK_GEN_ALL_CORE
For information on Fall & Injury Prevention, visit: [email or web address] OR  [email or web address] OR  https://www.cdc.gov/steadi/patient.html

## 2022-02-23 NOTE — DISCHARGE NOTE PROVIDER - CARE PROVIDER_API CALL
Linda Hodge)  [facility]  [address] [place] [address], [address] [place] [address]  Phone: [phone]  Fax: [phone]  Follow Up Time: 1 week

## 2022-02-23 NOTE — PROGRESS NOTE ADULT - ASSESSMENT
Assessment & Plan  41y Female  s/p Robotic assisted sleeve gastrectomy admitted to SICU for respiratory monitoring post operatively.     NEURO:    Acute pain-controlled with IV tylenol, gabapentin, Toradol, Dilaudid PRN    RESP:     Saturating well on room air    Activity- Amublate as tolerated    Encourage IS    Daily CXR      CARDS:   EKG reviewed  Monitor qtc if recieving antiemetics  Monitor for tachycardia    GI/NUTR:   GI ppx: protonix  Nausea/vomiting: scopolamine patch, zofran prn - monitor qtc  Diet, Bariatric Clears  LR @ 125    /RENAL:     Strict I/O- voiding    Labs:          BUN/Cr- 10/0.7          Electrolytes- Na 137, K 4.2, Mag 1.7, Phos 2.9    HEME/ONC:     DVT prophylaxis- LVX BID, SCDs    H&H: 13.8/42    ID:  Periop: Cefotetan  WBC 13.2      ENDO:  No active issues    LINES/DRAINS:  PIVs    DISPO:    SDU Assessment & Plan  41y Female  s/p Robotic assisted sleeve gastrectomy admitted to SICU for respiratory monitoring post operatively.     NEURO:    Acute pain-controlled with IV tylenol, gabapentin, Toradol    RESP:     Saturating well on room air    Activity- Amublate as tolerated    Encourage IS    Daily CXR      CARDS:   EKG reviewed  Monitor qtc if recieving antiemetics  Monitor for tachycardia    GI/NUTR:   GI ppx: protonix  Nausea/vomiting: scopolamine patch, monitor qtc  Diet, Bariatric Clears  LR @ 125    /RENAL:     Strict I/O- voiding    Labs:          BUN/Cr- 10/0.7          Electrolytes- Na 137, K 4.2, Mag 1.7, Phos 2.9    HEME/ONC:     DVT prophylaxis- LVX BID, SCDs    H&H: 13.8/42    ID:  Periop: Cefotetan  WBC 13.2  Afebrile      ENDO:  No active issues    LINES/DRAINS:  PIVs    DISPO:    SDU Assessment & Plan  41y Female  s/p Robotic assisted sleeve gastrectomy admitted to SICU for respiratory monitoring post operatively.     NEURO:    Acute pain-controlled with IV tylenol, gabapentin, Toradol    RESP:     Saturating well on room air    Activity- Amublate as tolerated    Encourage IS    Daily CXR      CARDS:   EKG reviewed  Monitor qtc if recieving antiemetics  Monitor for tachycardia    GI/NUTR:   GI ppx: protonix  Nausea/vomiting: scopolamine patch, monitor qtc  Diet, Bariatric Clears  LR @ 125    /RENAL:     Strict I/O- voiding    Labs:          BUN/Cr- 10/0.7          Electrolytes- Na 137, K 4.2, Mag 1.7, Phos 2.9    HEME/ONC:     DVT prophylaxis- LVX BID, SCDs    H&H: 13.8/42    ID:  Periop: Cefotetan  WBC 13.2  Afebrile      ENDO:  No active issues    LINES/DRAINS:  PIVs    DISPO:    Discharge

## 2022-02-23 NOTE — DISCHARGE NOTE PROVIDER - NSDCCPCAREPLAN_GEN_ALL_CORE_FT
PRINCIPAL DISCHARGE DIAGNOSIS  Diagnosis: Severe obesity  Assessment and Plan of Treatment: You are being discharged home. Can shower. No heavy lifting or gym until follow up appointment with Dr. Hodge. Pain meds sent to your pharmacy, take as directed. Call and schedule 1 week follow up appointment w Dr. Hodge. Clear liquid diet for one week, follow diet plan in binder that was provided. If you have fever (>100.4 F), severe pain, redness or discharge from incision sites call Dr. Hodge during regular business hours or go to the nearest emergency room for evaluation.

## 2022-02-23 NOTE — PROGRESS NOTE ADULT - ASSESSMENT
41y Female  with BMI of 50, pmh lap odessa, D+C, NETTIE presents following Robotic Assisted sleeve gastrectomy.    Plan    Lionel Clears  Lovenox BID  Incentive spirometry  encourage ambualation  pain control  monitor vitals  Voiding    Spectra: [phone]

## 2022-02-23 NOTE — PROGRESS NOTE ADULT - SUBJECTIVE AND OBJECTIVE BOX
GENERAL SURGERY PROGRESS NOTE    Patient: BETSY LUCERO , 41y ([birth year])Female   MRN: [id number]  Location: [place] [address] A  Visit: 02-22-22 Inpatient  Date: 02-23-22 @ 00:17    Hospital Day #: 2  Post-Op Day #: 1    Procedure/Dx/Injuries: s/p robotic assisted sleeve gastrectomy    Events of past 24 hours: Patient having moderate discomfort following surgery, denies nausea/vomiting, has voided, ambulating, no gas or BM, on 1 cup of cruzito clears.    PAST MEDICAL & SURGICAL HISTORY:  NETTIE (obstructive sleep apnea)    Obesity    History of surgery  lap odessa  d&amp;c          Vitals:   T(F): 98.6 (02-23-22 @ 00:05), Max: 98.6 (02-22-22 @ 17:28)  HR: 101 (02-23-22 @ 00:05)  BP: 141/90 (02-23-22 @ 00:05)  RR: 17 (02-23-22 @ 00:05)  SpO2: 95% (02-23-22 @ 00:05)      Diet, Clear Liquid:   Bariatric Clear Liquid (BARICLLIQ)      Fluids: lactated ringers.: Solution, 1000 milliLiter(s) infuse at 125 mL/Hr      I & O's:    Physical Examination:  General Appearance: NAD  HEENT: EOMI, sclera non-icteric.  Heart: RRR  Lungs: CTABL  Abdomen:  Soft, moderately tender, appropriate for post-op course, nondistended. No rigidity, guarding, or rebound tenderness.   MSK/Extremities: Warm & well-perfused. Peripheral pulses intact.  Skin: Warm, dry. No jaundice.   Incisions/Wounds: no signs of infection/active bleeding/drainage    MEDICATIONS  (STANDING):  enoxaparin Injectable 40 milliGRAM(s) SubCutaneous every 12 hours  lactated ringers. 1000 milliLiter(s) (125 mL/Hr) IV Continuous <Continuous>  pantoprazole  Injectable 40 milliGRAM(s) IV Push daily  sodium phosphate IVPB 15 milliMole(s) IV Intermittent once    MEDICATIONS  (PRN):  gabapentin Solution 100 milliGRAM(s) Oral three times a day PRN pain  ketorolac   Injectable 15 milliGRAM(s) IV Push every 8 hours PRN Moderate Pain (4 - 6)  ondansetron Injectable 4 milliGRAM(s) IV Push every 6 hours PRN Nausea      DVT PROPHYLAXIS: enoxaparin Injectable 40 milliGRAM(s) SubCutaneous every 12 hours    GI PROPHYLAXIS: pantoprazole  Injectable 40 milliGRAM(s) IV Push daily    ANTICOAGULATION:   ANTIBIOTICS:            LAB/STUDIES:  Labs:  CAPILLARY BLOOD GLUCOSE                              13.8   13.28 )-----------( 282      ( 22 Feb 2022 19:37 )             42.7       Auto Neutrophil %: 90.3 % (02-22-22 @ 19:37)  Auto Immature Granulocyte %: 0.4 % (02-22-22 @ 19:37)    02-22    137  |  102  |  10  ----------------------------<  146<H>  4.2   |  20  |  0.7      Calcium, Total Serum: 8.3 mg/dL (02-22-22 @ 19:37)      LFTs:         Coags:                           BARIATRIC SURGERY PROGRESS NOTE    Patient: BETSY LUCERO , 41y ([birth year])Female   MRN: [id number]  Location: [address]  Visit: 02-22-22 Inpatient  Date: 02-23-22 @ 00:17    Hospital Day #: 2  Post-Op Day #: 1    Procedure/Dx/Injuries: s/p robotic assisted sleeve gastrectomy    Events of past 24 hours: Patient having moderate discomfort following surgery, denies nausea/vomiting, has voided, ambulating, no gas or BM, on 1 cup of cruzito clears.    PAST MEDICAL & SURGICAL HISTORY:  NETTIE (obstructive sleep apnea)    Obesity    History of surgery  lap odessa  d&amp;c          Vitals:   T(F): 98.6 (02-23-22 @ 00:05), Max: 98.6 (02-22-22 @ 17:28)  HR: 101 (02-23-22 @ 00:05)  BP: 141/90 (02-23-22 @ 00:05)  RR: 17 (02-23-22 @ 00:05)  SpO2: 95% (02-23-22 @ 00:05)      Diet, Clear Liquid:   Bariatric Clear Liquid (BARICLLIQ)      Fluids: lactated ringers.: Solution, 1000 milliLiter(s) infuse at 125 mL/Hr      I & O's:    Physical Examination:  General Appearance: NAD  HEENT: EOMI, sclera non-icteric.  Heart: RRR  Lungs: CTABL  Abdomen:  Soft, moderately tender, appropriate for post-op course, nondistended. No rigidity, guarding, or rebound tenderness.   MSK/Extremities: Warm & well-perfused. Peripheral pulses intact.  Skin: Warm, dry. No jaundice.   Incisions/Wounds: no signs of infection/active bleeding/drainage    MEDICATIONS  (STANDING):  enoxaparin Injectable 40 milliGRAM(s) SubCutaneous every 12 hours  lactated ringers. 1000 milliLiter(s) (125 mL/Hr) IV Continuous <Continuous>  pantoprazole  Injectable 40 milliGRAM(s) IV Push daily  sodium phosphate IVPB 15 milliMole(s) IV Intermittent once    MEDICATIONS  (PRN):  gabapentin Solution 100 milliGRAM(s) Oral three times a day PRN pain  ketorolac   Injectable 15 milliGRAM(s) IV Push every 8 hours PRN Moderate Pain (4 - 6)  ondansetron Injectable 4 milliGRAM(s) IV Push every 6 hours PRN Nausea      DVT PROPHYLAXIS: enoxaparin Injectable 40 milliGRAM(s) SubCutaneous every 12 hours    GI PROPHYLAXIS: pantoprazole  Injectable 40 milliGRAM(s) IV Push daily    ANTICOAGULATION:   ANTIBIOTICS:            LAB/STUDIES:  Labs:  CAPILLARY BLOOD GLUCOSE                              13.8   13.28 )-----------( 282      ( 22 Feb 2022 19:37 )             42.7       Auto Neutrophil %: 90.3 % (02-22-22 @ 19:37)  Auto Immature Granulocyte %: 0.4 % (02-22-22 @ 19:37)    02-22    137  |  102  |  10  ----------------------------<  146<H>  4.2   |  20  |  0.7      Calcium, Total Serum: 8.3 mg/dL (02-22-22 @ 19:37)      LFTs:         Coags:

## 2022-02-23 NOTE — DISCHARGE NOTE PROVIDER - HOSPITAL COURSE
02/22/22 Robot-assisted sleeve gastrectomy and brice, transversus abdominis plane, bilateral. Uncomplicated postoperative course. Pt ambulated, voided, tolerated diet.  02/23/22 PT has no complains, ambulated, gas +, tolerates clear liquid diet, denies nausea or vomiting. Plan for discharge.

## 2022-02-23 NOTE — PROGRESS NOTE ADULT - SUBJECTIVE AND OBJECTIVE BOX
BETSY LUCERO  [id number]  41y Female    Indication for ICU admission:respiratory monitoring in setting of NETTIE post op  Admit Date:02-22-22  ICU Date: 02-22-22  OR Date:02-22-22    penicillins (Rash)    PAST MEDICAL & SURGICAL HISTORY:  NETTIE (obstructive sleep apnea)  Obesity  History of laparoscopic cholecystectomy      24HRS EVENT:  Night  -ambulating  -voiding  -mag and phos replaced  -pain controlled  -HR normal, hgb stable    DVT Prophylaxis: LVX, SCDs    GI Prophylaxis: PPI    ***Tubes/Lines/Drains  ***  Peripheral IV    [X] A ten-point review of systems was otherwise negative except as noted above.  [  ] Due to altered mental status/intubation, subjective information was not attained from the patient. History was obtained, to the extent possible, from review of the chart and collateral sources of information.       BETSY LUCERO  [id number]  41y Female    Indication for ICU admission:respiratory monitoring in setting of NETTIE post op  Admit Date:22  ICU Date: 22  OR Date:22    penicillins (Rash)    PAST MEDICAL & SURGICAL HISTORY:  NETTIE (obstructive sleep apnea)  Obesity  History of laparoscopic cholecystectomy      24HRS EVENT:  Night  -ambulating  -voiding  -mag and phos replaced  -pain controlled  -HR normal, hgb stable    DVT Prophylaxis: LVX, SCDs    GI Prophylaxis: PPI    ***Tubes/Lines/Drains  ***  Peripheral IV    [X] A ten-point review of systems was otherwise negative except as noted above.  [  ] Due to altered mental status/intubation, subjective information was not attained from the patient. History was obtained, to the extent possible, from review of the chart and collateral sources of information.      Daily Height in cm: 160.02 (2022 14:04)    Daily Weight in k.9 (2022 20:15)    Diet, Clear Liquid:   Bariatric Clear Liquid (BARICLLIQ) (22 @ 17:35)      CURRENT MEDS:  Neurologic Medications  gabapentin Solution 100 milliGRAM(s) Oral three times a day PRN pain  ketorolac   Injectable 15 milliGRAM(s) IV Push every 8 hours PRN Moderate Pain (4 - 6)    Respiratory Medications    Cardiovascular Medications    Gastrointestinal Medications  lactated ringers. 1000 milliLiter(s) IV Continuous <Continuous>  pantoprazole  Injectable 40 milliGRAM(s) IV Push daily    Genitourinary Medications    Hematologic/Oncologic Medications  enoxaparin Injectable 40 milliGRAM(s) SubCutaneous every 12 hours    Antimicrobial/Immunologic Medications    Endocrine/Metabolic Medications    Topical/Other Medications      ICU Vital Signs Last 24 Hrs  T(C): 36.9 (2022 07:41), Max: 37 (2022 17:28)  T(F): 98.4 (2022 07:41), Max: 98.6 (2022 17:28)  HR: 106 (2022 07:41) (72 - 106)  BP: 102/77 (2022 07:41) (102/77 - 151/91)  BP(mean): 85 (2022 07:41) (85 - 110)  ABP: --  ABP(mean): --  RR: 18 (2022 07:41) (16 - 27)  SpO2: 98% (2022 07:41) (95% - 98%)      Adult Advanced Hemodynamics Last 24 Hrs  CVP(mm Hg): --  CVP(cm H2O): --  CO: --  CI: --  PA: --  PA(mean): --  PCWP: --  SVR: --  SVRI: --  PVR: --  PVRI: --          I&O's Summary    2022 07:01  -  2022 07:00  --------------------------------------------------------  IN: 1950 mL / OUT: 850 mL / NET: 1100 mL    2022 07:  -  2022 07:46  --------------------------------------------------------  IN: 125 mL / OUT: 0 mL / NET: 125 mL      I&O's Detail    2022 07:01  -  2022 07:00  --------------------------------------------------------  IN:    IV PiggyBack: 50 mL    IV PiggyBack: 250 mL    Lactated Ringers: 150 mL    Lactated Ringers: 1500 mL  Total IN: 1950 mL    OUT:    Voided (mL): 850 mL  Total OUT: 850 mL    Total NET: 1100 mL      2022 07:01  -  2022 07:46  --------------------------------------------------------  IN:    Lactated Ringers: 125 mL  Total IN: 125 mL    OUT:  Total OUT: 0 mL    Total NET: 125 mL      PHYSICAL EXAM:   a&ox3  follows commands, INGRAM  no acute distress  equal chest rise b/l  abdomen soft  extremities soft  voiding

## 2022-02-23 NOTE — PROGRESS NOTE ADULT - ATTENDING COMMENTS
42yo female with class III obesity POD#1 s/p robotic sleeve gastrectomy. Recorded tachycardia over night, resolved this morning. Patient complains of incisional pain. No nausea/vomiting, fever/chills, chest pain or shortness of breath. Started bariatric clear liquid diet last night and tolerated, advance this morning. Ambulating well. Using incentive spirometer. Continue DVT ppx with heparin, incentive spirometry, and GI ppx. Discharge home today.
doing well, clears po per rpimary.

## 2022-02-24 LAB — SURGICAL PATHOLOGY STUDY: SIGNIFICANT CHANGE UP

## 2022-02-25 DIAGNOSIS — Z20.822 CONTACT WITH AND (SUSPECTED) EXPOSURE TO COVID-19: ICD-10-CM

## 2022-02-25 DIAGNOSIS — E66.01 MORBID (SEVERE) OBESITY DUE TO EXCESS CALORIES: ICD-10-CM

## 2022-02-28 ENCOUNTER — NON-APPOINTMENT (OUTPATIENT)
Age: 42
End: 2022-02-28

## 2022-02-28 DIAGNOSIS — L76.82 OTHER POSTPROCEDURAL COMPLICATIONS OF SKIN AND SUBCUTANEOUS TISSUE: ICD-10-CM

## 2022-02-28 RX ORDER — GABAPENTIN 250 MG/5ML
250 SOLUTION ORAL EVERY 8 HOURS
Qty: 15 | Refills: 0 | Status: COMPLETED | COMMUNITY
Start: 2022-02-15 | End: 2022-02-28

## 2022-03-02 ENCOUNTER — APPOINTMENT (OUTPATIENT)
Dept: SURGERY | Facility: CLINIC | Age: 42
End: 2022-03-02
Payer: COMMERCIAL

## 2022-03-02 VITALS
OXYGEN SATURATION: 98 % | BODY MASS INDEX: 50.9 KG/M2 | DIASTOLIC BLOOD PRESSURE: 72 MMHG | HEIGHT: 62 IN | SYSTOLIC BLOOD PRESSURE: 124 MMHG | WEIGHT: 276.6 LBS | HEART RATE: 115 BPM | TEMPERATURE: 97.8 F

## 2022-03-02 PROBLEM — G47.33 OBSTRUCTIVE SLEEP APNEA (ADULT) (PEDIATRIC): Chronic | Status: ACTIVE | Noted: 2022-02-01

## 2022-03-02 PROBLEM — E66.9 OBESITY, UNSPECIFIED: Chronic | Status: ACTIVE | Noted: 2022-02-01

## 2022-03-02 PROCEDURE — 99024 POSTOP FOLLOW-UP VISIT: CPT

## 2022-03-02 NOTE — ASSESSMENT
[___ Days Post Op] : [unfilled] days [de-identified] : 42yo female s/p robotic sleeve gastrectomy 2/22/22. Doing well.\par -continue adequate protein intake - goal of 70g per day\par -continue adequate hydration - goal of 60oz water or equivalent per day\par -continue PPI, MV, calcium, and B12\par -continue lovenox as scheduled\par -continue light activity \par -return to office in 1 week for wound check\par -call with concerns

## 2022-03-02 NOTE — HISTORY OF PRESENT ILLNESS
[Procedure: ___] : Procedure performed: [unfilled]  [Date of Surgery: ___] : Date of Surgery:   [unfilled] [Surgeon Name:   ___] : Surgeon Name: Dr. BARNES [___ Days Post Op] : [unfilled] days  [Phase 1] : Phase 1 [de-identified] : 40yo female s/p robotic sleeve gastrectomy 2/22/22. Reports abdominal pain LUQ. Denies nausea/vomiting, fever/chills, chest pain. Occasional shortness of breath that responds to her inhaler. Ambulating. Drinking 2 protein shakes per day. Started on puree diet with squash soup.

## 2022-03-02 NOTE — PHYSICAL EXAM
[de-identified] : soft, non-tender, no rebound/guarding, no masses/hernias, wounds without erythema, drainage, or induration; diffuse ecchymosis bilaterally around lateral incisions, LUQ tenderness

## 2022-03-03 ENCOUNTER — INPATIENT (INPATIENT)
Facility: HOSPITAL | Age: 42
LOS: 1 days | Discharge: HOME | End: 2022-03-05
Attending: STUDENT IN AN ORGANIZED HEALTH CARE EDUCATION/TRAINING PROGRAM | Admitting: STUDENT IN AN ORGANIZED HEALTH CARE EDUCATION/TRAINING PROGRAM
Payer: COMMERCIAL

## 2022-03-03 VITALS
RESPIRATION RATE: 18 BRPM | HEIGHT: 63 IN | DIASTOLIC BLOOD PRESSURE: 66 MMHG | TEMPERATURE: 100 F | OXYGEN SATURATION: 99 % | SYSTOLIC BLOOD PRESSURE: 141 MMHG | HEART RATE: 105 BPM | WEIGHT: 276.02 LBS

## 2022-03-03 DIAGNOSIS — Z90.3 ACQUIRED ABSENCE OF STOMACH [PART OF]: Chronic | ICD-10-CM

## 2022-03-03 DIAGNOSIS — Z98.890 OTHER SPECIFIED POSTPROCEDURAL STATES: Chronic | ICD-10-CM

## 2022-03-03 LAB
ALBUMIN SERPL ELPH-MCNC: 3.8 G/DL — SIGNIFICANT CHANGE UP (ref 3.5–5.2)
ALP SERPL-CCNC: 89 U/L — SIGNIFICANT CHANGE UP (ref 30–115)
ALT FLD-CCNC: 20 U/L — SIGNIFICANT CHANGE UP (ref 0–41)
ANION GAP SERPL CALC-SCNC: 11 MMOL/L — SIGNIFICANT CHANGE UP (ref 7–14)
ANION GAP SERPL CALC-SCNC: 12 MMOL/L — SIGNIFICANT CHANGE UP (ref 7–14)
ANISOCYTOSIS BLD QL: SLIGHT — SIGNIFICANT CHANGE UP
APTT BLD: 28.1 SEC — SIGNIFICANT CHANGE UP (ref 27–39.2)
AST SERPL-CCNC: 16 U/L — SIGNIFICANT CHANGE UP (ref 0–41)
BASOPHILS # BLD AUTO: 0 K/UL — SIGNIFICANT CHANGE UP (ref 0–0.2)
BASOPHILS # BLD AUTO: 0.04 K/UL — SIGNIFICANT CHANGE UP (ref 0–0.2)
BASOPHILS NFR BLD AUTO: 0 % — SIGNIFICANT CHANGE UP (ref 0–1)
BASOPHILS NFR BLD AUTO: 0.3 % — SIGNIFICANT CHANGE UP (ref 0–1)
BILIRUB SERPL-MCNC: 0.8 MG/DL — SIGNIFICANT CHANGE UP (ref 0.2–1.2)
BLD GP AB SCN SERPL QL: SIGNIFICANT CHANGE UP
BUN SERPL-MCNC: 10 MG/DL — SIGNIFICANT CHANGE UP (ref 10–20)
BUN SERPL-MCNC: 8 MG/DL — LOW (ref 10–20)
CALCIUM SERPL-MCNC: 8.1 MG/DL — LOW (ref 8.5–10.1)
CALCIUM SERPL-MCNC: 8.5 MG/DL — SIGNIFICANT CHANGE UP (ref 8.5–10.1)
CHLORIDE SERPL-SCNC: 104 MMOL/L — SIGNIFICANT CHANGE UP (ref 98–110)
CHLORIDE SERPL-SCNC: 104 MMOL/L — SIGNIFICANT CHANGE UP (ref 98–110)
CO2 SERPL-SCNC: 22 MMOL/L — SIGNIFICANT CHANGE UP (ref 17–32)
CO2 SERPL-SCNC: 23 MMOL/L — SIGNIFICANT CHANGE UP (ref 17–32)
CREAT SERPL-MCNC: 0.7 MG/DL — SIGNIFICANT CHANGE UP (ref 0.7–1.5)
CREAT SERPL-MCNC: 0.7 MG/DL — SIGNIFICANT CHANGE UP (ref 0.7–1.5)
EGFR: 111 ML/MIN/1.73M2 — SIGNIFICANT CHANGE UP
EGFR: 111 ML/MIN/1.73M2 — SIGNIFICANT CHANGE UP
EOSINOPHIL # BLD AUTO: 0 K/UL — SIGNIFICANT CHANGE UP (ref 0–0.7)
EOSINOPHIL # BLD AUTO: 0.18 K/UL — SIGNIFICANT CHANGE UP (ref 0–0.7)
EOSINOPHIL NFR BLD AUTO: 0 % — SIGNIFICANT CHANGE UP (ref 0–8)
EOSINOPHIL NFR BLD AUTO: 1.3 % — SIGNIFICANT CHANGE UP (ref 0–8)
GIANT PLATELETS BLD QL SMEAR: PRESENT — SIGNIFICANT CHANGE UP
GLUCOSE SERPL-MCNC: 124 MG/DL — HIGH (ref 70–99)
GLUCOSE SERPL-MCNC: 98 MG/DL — SIGNIFICANT CHANGE UP (ref 70–99)
HCG SERPL QL: NEGATIVE — SIGNIFICANT CHANGE UP
HCT VFR BLD CALC: 22 % — LOW (ref 37–47)
HCT VFR BLD CALC: 27.1 % — LOW (ref 37–47)
HGB BLD-MCNC: 6.6 G/DL — CRITICAL LOW (ref 12–16)
HGB BLD-MCNC: 8.4 G/DL — LOW (ref 12–16)
HYPOCHROMIA BLD QL: SLIGHT — SIGNIFICANT CHANGE UP
IMM GRANULOCYTES NFR BLD AUTO: 1.9 % — HIGH (ref 0.1–0.3)
INR BLD: 1.28 RATIO — SIGNIFICANT CHANGE UP (ref 0.65–1.3)
LIDOCAIN IGE QN: 65 U/L — HIGH (ref 7–60)
LYMPHOCYTES # BLD AUTO: 1.64 K/UL — SIGNIFICANT CHANGE UP (ref 1.2–3.4)
LYMPHOCYTES # BLD AUTO: 12 % — LOW (ref 20.5–51.1)
LYMPHOCYTES # BLD AUTO: 15.3 % — LOW (ref 20.5–51.1)
LYMPHOCYTES # BLD AUTO: 2.18 K/UL — SIGNIFICANT CHANGE UP (ref 1.2–3.4)
MAGNESIUM SERPL-MCNC: 2.1 MG/DL — SIGNIFICANT CHANGE UP (ref 1.8–2.4)
MANUAL SMEAR VERIFICATION: SIGNIFICANT CHANGE UP
MCHC RBC-ENTMCNC: 27.2 PG — SIGNIFICANT CHANGE UP (ref 27–31)
MCHC RBC-ENTMCNC: 27.8 PG — SIGNIFICANT CHANGE UP (ref 27–31)
MCHC RBC-ENTMCNC: 30 G/DL — LOW (ref 32–37)
MCHC RBC-ENTMCNC: 31 G/DL — LOW (ref 32–37)
MCV RBC AUTO: 89.7 FL — SIGNIFICANT CHANGE UP (ref 81–99)
MCV RBC AUTO: 90.5 FL — SIGNIFICANT CHANGE UP (ref 81–99)
METAMYELOCYTES # FLD: 0.8 % — HIGH (ref 0–0)
MICROCYTES BLD QL: SLIGHT — SIGNIFICANT CHANGE UP
MONOCYTES # BLD AUTO: 0.36 K/UL — SIGNIFICANT CHANGE UP (ref 0.1–0.6)
MONOCYTES # BLD AUTO: 0.91 K/UL — HIGH (ref 0.1–0.6)
MONOCYTES NFR BLD AUTO: 2.6 % — SIGNIFICANT CHANGE UP (ref 1.7–9.3)
MONOCYTES NFR BLD AUTO: 6.4 % — SIGNIFICANT CHANGE UP (ref 1.7–9.3)
NEUTROPHILS # BLD AUTO: 10.64 K/UL — HIGH (ref 1.4–6.5)
NEUTROPHILS # BLD AUTO: 11.59 K/UL — HIGH (ref 1.4–6.5)
NEUTROPHILS NFR BLD AUTO: 74.8 % — SIGNIFICANT CHANGE UP (ref 42.2–75.2)
NEUTROPHILS NFR BLD AUTO: 84.6 % — HIGH (ref 42.2–75.2)
NRBC # BLD: 0 /100 WBCS — SIGNIFICANT CHANGE UP (ref 0–0)
PHOSPHATE SERPL-MCNC: 2.9 MG/DL — SIGNIFICANT CHANGE UP (ref 2.1–4.9)
PLAT MORPH BLD: NORMAL — SIGNIFICANT CHANGE UP
PLATELET # BLD AUTO: 394 K/UL — SIGNIFICANT CHANGE UP (ref 130–400)
PLATELET # BLD AUTO: 424 K/UL — HIGH (ref 130–400)
POLYCHROMASIA BLD QL SMEAR: SIGNIFICANT CHANGE UP
POTASSIUM SERPL-MCNC: 4.4 MMOL/L — SIGNIFICANT CHANGE UP (ref 3.5–5)
POTASSIUM SERPL-MCNC: 4.6 MMOL/L — SIGNIFICANT CHANGE UP (ref 3.5–5)
POTASSIUM SERPL-SCNC: 4.4 MMOL/L — SIGNIFICANT CHANGE UP (ref 3.5–5)
POTASSIUM SERPL-SCNC: 4.6 MMOL/L — SIGNIFICANT CHANGE UP (ref 3.5–5)
PROT SERPL-MCNC: 6.4 G/DL — SIGNIFICANT CHANGE UP (ref 6–8)
PROTHROM AB SERPL-ACNC: 14.7 SEC — HIGH (ref 9.95–12.87)
RBC # BLD: 2.43 M/UL — LOW (ref 4.2–5.4)
RBC # BLD: 3.02 M/UL — LOW (ref 4.2–5.4)
RBC # FLD: 15.8 % — HIGH (ref 11.5–14.5)
RBC # FLD: 16.6 % — HIGH (ref 11.5–14.5)
RBC BLD AUTO: ABNORMAL
SARS-COV-2 RNA SPEC QL NAA+PROBE: SIGNIFICANT CHANGE UP
SODIUM SERPL-SCNC: 137 MMOL/L — SIGNIFICANT CHANGE UP (ref 135–146)
SODIUM SERPL-SCNC: 139 MMOL/L — SIGNIFICANT CHANGE UP (ref 135–146)
WBC # BLD: 13.7 K/UL — HIGH (ref 4.8–10.8)
WBC # BLD: 14.22 K/UL — HIGH (ref 4.8–10.8)
WBC # FLD AUTO: 13.7 K/UL — HIGH (ref 4.8–10.8)
WBC # FLD AUTO: 14.22 K/UL — HIGH (ref 4.8–10.8)

## 2022-03-03 PROCEDURE — 99291 CRITICAL CARE FIRST HOUR: CPT

## 2022-03-03 PROCEDURE — 99024 POSTOP FOLLOW-UP VISIT: CPT

## 2022-03-03 PROCEDURE — 93010 ELECTROCARDIOGRAM REPORT: CPT

## 2022-03-03 PROCEDURE — 74177 CT ABD & PELVIS W/CONTRAST: CPT | Mod: 26,MA

## 2022-03-03 RX ORDER — SODIUM CHLORIDE 9 MG/ML
1000 INJECTION, SOLUTION INTRAVENOUS
Refills: 0 | Status: DISCONTINUED | OUTPATIENT
Start: 2022-03-03 | End: 2022-03-04

## 2022-03-03 RX ORDER — IOHEXOL 300 MG/ML
30 INJECTION, SOLUTION INTRAVENOUS ONCE
Refills: 0 | Status: COMPLETED | OUTPATIENT
Start: 2022-03-03 | End: 2022-03-03

## 2022-03-03 RX ORDER — ACETAMINOPHEN 500 MG
650 TABLET ORAL EVERY 6 HOURS
Refills: 0 | Status: DISCONTINUED | OUTPATIENT
Start: 2022-03-03 | End: 2022-03-04

## 2022-03-03 RX ORDER — ONDANSETRON 8 MG/1
4 TABLET, FILM COATED ORAL ONCE
Refills: 0 | Status: COMPLETED | OUTPATIENT
Start: 2022-03-03 | End: 2022-03-03

## 2022-03-03 RX ADMIN — IOHEXOL 30 MILLILITER(S): 300 INJECTION, SOLUTION INTRAVENOUS at 12:12

## 2022-03-03 RX ADMIN — SODIUM CHLORIDE 125 MILLILITER(S): 9 INJECTION, SOLUTION INTRAVENOUS at 23:38

## 2022-03-03 NOTE — H&P ADULT - NSHPLABSRESULTS_GEN_ALL_CORE
LAB/STUDIES:  Labs:  CAPILLARY BLOOD GLUCOSE                       6.6    13.70 )-----------( 424      ( 03 Mar 2022 11:55 )             22.0       Auto Neutrophil %: 84.6 % (03-03-22 @ 11:55)    03-03    137  |  104  |  10  ----------------------------<  98  4.4   |  22  |  0.7      Calcium, Total Serum: 8.1 mg/dL (03-03-22 @ 11:55)      LFTs:             6.4  | 0.8  | 16       ------------------[89      ( 03 Mar 2022 11:55 )  3.8  | x    | 20          Lipase:65     Amylase:x             Coags:     14.70  ----< 1.28    ( 03 Mar 2022 11:55 )     28.1        Imaging:  CT Abd/pelvis:     1. Post gastric sleeve surgery with perigastric 17.7 x 15.6 x 10 cm hematoma.    2. IUD present, exact location difficult to determine, however IUD appears low, with arm possibly penetrating myometrium on left.

## 2022-03-03 NOTE — ED PROVIDER NOTE - NS ED ROS FT
Review of Systems:  CONSTITUTIONAL: + lightheadedness, No fever, No diaphoresis, No weight change  SKIN: No rash  HEMATOLOGIC: No abnormal bleeding or bruising  EYES: No eye pain, No blurred vision  ENT: No change in hearing, No sore throat, No neck pain, No rhinorrhea, No ear pain  RESPIRATORY: No shortness of breath, No cough  CARDIAC: No chest pain, + palpitations  GI: + abdominal pain, + nausea, No vomiting, No diarrhea, No constipation, No bright red blood per rectum or melena. No flank pain  : No dysuria, frequency, hematuria.   ENDO: No polydypsia, No polyuria, No heat/cold intolerance  MUSCULOSKELETAL: No joint paint, No swelling, No back pain  NEUROLOGIC: No numbness, No focal weakness, No headache, No dizziness  All other systems negative, unless specified in HPI

## 2022-03-03 NOTE — ED PROVIDER NOTE - OBJECTIVE STATEMENT
Pt is a 42 y/o female with PMH of obesity s/p gastric sleeve surgery (2/22/22) and asthma coming in for low hemoglobin found on bloodwork yesterday. Pt says she saw her cardiologist Dr. Almazan and bloodwork showed a hemoglobin of 6. Had recent gastric sleeve surgery with Dr. Hodge and says she required cardio clearance for surgery and that is why she has a cardiologist. Pt was discharged on lovenox 40 once a day which she has been compliant with. Says she noted bruising on her abdomen at the site of the incisions post op but thought it would go away. Bruising persisted and pt reported Pt is a 42 y/o female with PMH of obesity s/p gastric sleeve surgery (2/22/22) and asthma coming in for low hemoglobin found on bloodwork yesterday. Pt says she saw her cardiologist Dr. Almazan and bloodwork showed a hemoglobin of 6. Had recent gastric sleeve surgery with Dr. Hodge and says she required cardio clearance for surgery and that is why she has a cardiologist. Pt was discharged on lovenox 40 once a day which she has been compliant with. Says she noted bruising on her abdomen at the site of the incisions post op but thought it would go away. Bruising persisted and pt reported feeling lightheaded and palpitations x 1 week. Associated with mild nausea and abdominal pain. No fever, chills, chest pain or black or bloody stools.

## 2022-03-03 NOTE — ED PROVIDER NOTE - ATTENDING CONTRIBUTION TO CARE
I personally evaluated the patient. I reviewed the Resident’s or Physician Assistant’s note (as assigned above), and agree with the findings and plan except as documented in my note.   41-year-old female status post gastric sleeve surgery 2/22/2022, asthma sent to the ED with low hemoglobin on blood work from yesterday.  Patient saw her cardiologist yesterday because of palpitations and dyspnea on exertion and was called today with abnormal results.  Patient complaining of large painful hematomas to bilateral abdomen and flanks.  Patient was discharged on Lovenox once a day.  Positive palpitations, dizziness, dyspnea on exertion.  No chest pain.  No nausea, vomiting.  No fever, chills.  Vitals noted.  CONSTITUTIONAL: Well-appearing; well-nourished; in no apparent distress.   HEAD: Normocephalic; atraumatic.   EYES: PERRL; EOM intact. Conjunctiva normal B/L.   ENT: Normal pharynx with no tonsillar hypertrophy. MMM.  NECK: Supple; non-tender; no cervical lymphadenopathy.   CHEST: Normal chest excursion with respiration.   CARDIOVASCULAR: Normal S1, S2; no murmurs, rubs, or gallops.   RESPIRATORY: Normal chest excursion with respiration; breath sounds clear and equal bilaterally; no wheezes, rhonchi, or rales.  GI/: Lap scars healing well. + diffuse tenderness. No rebound or guarding. + hematomas to B/L abdomen and flanks. + TTP. Rectal with no gross blood.   BACK: No evidence of trauma or deformity. Non-tender to palpation. No CVA tenderness.   EXT: Normal ROM in all four extremities; non-tender to palpation; distal pulses are normal. No leg edema B/L.   SKIN: Normal for age and race; warm; dry; good turgor.  NEURO: A & O x 4; CN 2-12 intact. Grossly unremarkable. I personally evaluated the patient. I reviewed the Resident’s or Physician Assistant’s note (as assigned above), and agree with the findings and plan except as documented in my note.     41-year-old female status post gastric sleeve surgery 2/22/2022, asthma sent to the ED with low hemoglobin on blood work from yesterday.  Patient saw her cardiologist yesterday because of palpitations and dyspnea on exertion and was called today with abnormal results.  Patient complaining of large painful hematomas to bilateral abdomen and flanks.  Patient was discharged on Lovenox once a day.  Positive palpitations, dizziness, dyspnea on exertion.  No chest pain.  No nausea, vomiting.  No fever, chills.  Vitals noted.  CONSTITUTIONAL: Well-appearing; well-nourished; in no apparent distress.   HEAD: Normocephalic; atraumatic.   EYES: PERRL; EOM intact. Conjunctiva normal B/L.   ENT: Normal pharynx with no tonsillar hypertrophy. MMM.  NECK: Supple; non-tender; no cervical lymphadenopathy.   CHEST: Normal chest excursion with respiration.   CARDIOVASCULAR: Normal S1, S2; no murmurs, rubs, or gallops.   RESPIRATORY: Normal chest excursion with respiration; breath sounds clear and equal bilaterally; no wheezes, rhonchi, or rales.  GI/: Lap scars healing well. + diffuse tenderness. No rebound or guarding. + hematomas to B/L abdomen and flanks. + TTP. Rectal with no gross blood.   BACK: No evidence of trauma or deformity. Non-tender to palpation. No CVA tenderness.   EXT: Normal ROM in all four extremities; non-tender to palpation; distal pulses are normal. No leg edema B/L.   SKIN: Normal for age and race; warm; dry; good turgor.  NEURO: A & O x 4; CN 2-12 intact. Grossly unremarkable.

## 2022-03-03 NOTE — ED PROVIDER NOTE - PHYSICAL EXAMINATION
CONST: well appearing for age  HEAD:  normocephalic, atraumatic  EYES:  conjunctivae without injection, drainage or discharge  ENMT:  tympanic membranes pearly gray with normal landmarks; nasal mucosa moist; mouth moist without ulcerations or lesions; throat moist without erythema, exudate, ulcerations or lesions  NECK:  supple, no masses, no significant lymphadenopathy  CARDIAC:  regular rate and rhythm, normal S1 and S2, no murmurs, rubs or gallops  RESP:  respiratory rate and effort appear normal for age; lungs are clear to auscultation bilaterally; no rales or wheezes  ABDOMEN:  soft, 5 well healing incisions, ecchymoses across lower abdomen, diffuse tenderness, GAMA negative  LYMPHATICS:  no significant lymphadenopathy  MUSCULOSKELETAL/NEURO:  CN II-XII grossly intact, sensation intact throughout, 5/5 strength in all extremities, normal gait, normal movement, normal tone  SKIN:  normal skin color for age and race, well-perfused; warm and dry

## 2022-03-03 NOTE — ED ADULT TRIAGE NOTE - CHIEF COMPLAINT QUOTE
pt sent in by dr. Wagner for hemoglobin of  6 and possible transfusion. denies any bleeding. denies pain. pt recently had gastric sleeve surgery on 02/22.

## 2022-03-03 NOTE — H&P ADULT - NSHPREVIEWOFSYSTEMS_GEN_ALL_CORE
REVIEW OF SYSTEMS      General:light headed  Skin/Breast: abdominal wall bruising  Ophthalmologic: none  ENMT:	none  Respiratory and Thorax:none  Cardiovascular: palpitations  Gastrointestinal:+abdominal pain, denies nausea.vomiting  Genitourinary:	none  Musculoskeletal:	none  Neurological:	none  Psychiatric:	none  Hematology/Lymphatics:	brusing  Endocrine:none	  Allergic/Immunologic:none

## 2022-03-03 NOTE — ED PROVIDER NOTE - CLINICAL SUMMARY MEDICAL DECISION MAKING FREE TEXT BOX
41-year-old female status post robotic gastric sleeve 2/22 sent to the ED by PMD with a significant decrease in hemoglobin.  CT scan with large hematoma around the surgical site.  Patient transfused blood.  Surgery consulted.  Patient admitted to surgical service.

## 2022-03-03 NOTE — H&P ADULT - ASSESSMENT
A/P 42yo female s/p robotic sleeve gastrectomy on 2/22 now with intrabaominal hematoma, acute blood loss anemia  -Admit to surgery floor  -Will give 1unit PRBCs due to Hb 6.6 with symptomatic anemia  -CT reviewed d/w IR who cannot drain, will book for the OR tomorrow for diagnostic laparoscopy with evacuation of hematoma   -Lionel clears for now, NPO at midnight   -PRN pain, nausea meds  -Will start daily lov ppx tomorrow AM  -Seen and discussed with Dr Hodge- and plan was discussed with patient. Discussed surgery with patient including the risks, benefits and alternatives and that drainage is necessary to evacuate the hematoma to improve her symptoms due to the size.     Eevlyn Peña MD MIS fellow

## 2022-03-03 NOTE — ED PROVIDER NOTE - PROGRESS NOTE DETAILS
PS: Large perigastric hematoma seen on CT. Surgery aware PS: Surgery taking pt to OR tomorrow. Will admit to their service PS: Pt's hgb 6.6, 2 units pRBCs ordered.

## 2022-03-03 NOTE — H&P ADULT - HISTORY OF PRESENT ILLNESS
Patient: BETSY LUCERO , 41y ([birth year])Female   MRN: [id number]  Location: [facility] ED  Visit: 03-03-22 Emergency  Date: 03-03-22 @ 16:05      40 yo female with class 3 obesity s/p robotic sleeve gastrectomy on 2/22/22. She was doing well post op and went home POD1. She did go home on daily lovenox for dvt ppx due to her higher risk for dvt (high BMI and IUD.) She noticed that she had large bruises a/w the trocar sites that began a few days after surgery. She also noted some epigastric pain. She has been tolerating her diet and progressing to thicker liquids without nausea or vomiting.   She was seen in the office yesterday and c/o epigastric pain and some intermittent palpitations. The bruising was noted at that time.  She also saw her cardiologist. A CBC was ordered and noted a Hb of 6.6. She was then sent to the ED for a transfusion. Last night, she developed worsening pain and was unable to sleep without taking tylenol.     A CT scan was ordered and showed a large intraabdominal hematoma abutting the stomach.

## 2022-03-03 NOTE — H&P ADULT - NSHPPHYSICALEXAM_GEN_ALL_CORE
Vital Signs Last 24 Hrs  T(C): 37.7 (03 Mar 2022 11:12), Max: 37.7 (03 Mar 2022 11:12)  T(F): 99.9 (03 Mar 2022 11:12), Max: 99.9 (03 Mar 2022 11:12)  HR: 105 (03 Mar 2022 11:12) (105 - 105)  BP: 141/66 (03 Mar 2022 11:12) (141/66 - 141/66)  BP(mean): --  RR: 18 (03 Mar 2022 11:12) (18 - 18)  SpO2: 99% (03 Mar 2022 11:12) (99% - 99%)    PHYSICAL EXAM:   General Appearance: NAD  HEENT: Normocephalice  Heart: reg tachy   Lungs: No increased work of breathing or accessory muscle use.   Abdomen:  Soft, mod ttp epigastrium/LUQ, ecchymosis a/w trochar incisions  MSK/Extremities: Warm & well-perfused.   Skin: Warm, dry. No jaundice.   Incision/wound: healing well, clean, dry and intact- with ecchymosis surrounding bilateral wounds

## 2022-03-03 NOTE — H&P ADULT - ATTENDING COMMENTS
42yo female with PMHx of class III obesity s/p robotic sleeve gastrectomy 2/22/2022 presenting with weakness, palpitations and drop in Hb. She was discharged home on 2/23 POD#1 on Lovenox. On exam, patient is tachycardic 115, extensive subcutaneous hematomas and LUQ tenderness without peritonitis. Labs significant for Hb 6.6 from 13. CT A/P demonstrates [id number] hematoma posterior stomach. Discussed with IR - unable to percutaneously drain. Will plan for OR drainage tomorrow.

## 2022-03-03 NOTE — PATIENT PROFILE ADULT - FALL HARM RISK - UNIVERSAL INTERVENTIONS
Bed in lowest position, wheels locked, appropriate side rails in place/Call bell, personal items and telephone in reach/Instruct patient to call for assistance before getting out of bed or chair/Non-slip footwear when patient is out of bed/[place] to call system/Physically safe environment - no spills, clutter or unnecessary equipment/Purposeful Proactive Rounding/Room/bathroom lighting operational, light cord in reach

## 2022-03-04 PROCEDURE — 99024 POSTOP FOLLOW-UP VISIT: CPT

## 2022-03-04 PROCEDURE — 10140 I&D HMTMA SEROMA/FLUID COLLJ: CPT | Mod: 58

## 2022-03-04 PROCEDURE — 71045 X-RAY EXAM CHEST 1 VIEW: CPT | Mod: 26

## 2022-03-04 RX ORDER — OXYCODONE AND ACETAMINOPHEN 5; 325 MG/1; MG/1
1 TABLET ORAL ONCE
Refills: 0 | Status: DISCONTINUED | OUTPATIENT
Start: 2022-03-04 | End: 2022-03-05

## 2022-03-04 RX ORDER — ONDANSETRON 8 MG/1
4 TABLET, FILM COATED ORAL ONCE
Refills: 0 | Status: DISCONTINUED | OUTPATIENT
Start: 2022-03-04 | End: 2022-03-05

## 2022-03-04 RX ORDER — MORPHINE SULFATE 50 MG/1
2 CAPSULE, EXTENDED RELEASE ORAL
Refills: 0 | Status: DISCONTINUED | OUTPATIENT
Start: 2022-03-04 | End: 2022-03-05

## 2022-03-04 RX ORDER — ENOXAPARIN SODIUM 100 MG/ML
40 INJECTION SUBCUTANEOUS EVERY 12 HOURS
Refills: 0 | Status: DISCONTINUED | OUTPATIENT
Start: 2022-03-04 | End: 2022-03-05

## 2022-03-04 RX ORDER — SODIUM CHLORIDE 9 MG/ML
1000 INJECTION, SOLUTION INTRAVENOUS
Refills: 0 | Status: DISCONTINUED | OUTPATIENT
Start: 2022-03-04 | End: 2022-03-05

## 2022-03-04 RX ORDER — ACETAMINOPHEN 500 MG
650 TABLET ORAL EVERY 6 HOURS
Refills: 0 | Status: DISCONTINUED | OUTPATIENT
Start: 2022-03-04 | End: 2022-03-05

## 2022-03-04 RX ORDER — SODIUM CHLORIDE 9 MG/ML
1000 INJECTION, SOLUTION INTRAVENOUS
Refills: 0 | Status: DISCONTINUED | OUTPATIENT
Start: 2022-03-04 | End: 2022-03-04

## 2022-03-04 RX ORDER — BUPIVACAINE 13.3 MG/ML
20 INJECTION, SUSPENSION, LIPOSOMAL INFILTRATION ONCE
Refills: 0 | Status: DISCONTINUED | OUTPATIENT
Start: 2022-03-04 | End: 2022-03-05

## 2022-03-04 RX ORDER — HYDROMORPHONE HYDROCHLORIDE 2 MG/ML
0.5 INJECTION INTRAMUSCULAR; INTRAVENOUS; SUBCUTANEOUS
Refills: 0 | Status: DISCONTINUED | OUTPATIENT
Start: 2022-03-04 | End: 2022-03-05

## 2022-03-04 RX ADMIN — SODIUM CHLORIDE 125 MILLILITER(S): 9 INJECTION, SOLUTION INTRAVENOUS at 21:53

## 2022-03-04 RX ADMIN — HYDROMORPHONE HYDROCHLORIDE 0.5 MILLIGRAM(S): 2 INJECTION INTRAMUSCULAR; INTRAVENOUS; SUBCUTANEOUS at 20:00

## 2022-03-04 RX ADMIN — Medication 650 MILLIGRAM(S): at 14:47

## 2022-03-04 RX ADMIN — Medication 650 MILLIGRAM(S): at 14:48

## 2022-03-04 RX ADMIN — SODIUM CHLORIDE 125 MILLILITER(S): 9 INJECTION, SOLUTION INTRAVENOUS at 13:00

## 2022-03-04 RX ADMIN — SODIUM CHLORIDE 75 MILLILITER(S): 9 INJECTION, SOLUTION INTRAVENOUS at 19:23

## 2022-03-04 RX ADMIN — HYDROMORPHONE HYDROCHLORIDE 0.5 MILLIGRAM(S): 2 INJECTION INTRAMUSCULAR; INTRAVENOUS; SUBCUTANEOUS at 20:18

## 2022-03-04 RX ADMIN — ENOXAPARIN SODIUM 40 MILLIGRAM(S): 100 INJECTION SUBCUTANEOUS at 20:00

## 2022-03-04 NOTE — PRE-OP CHECKLIST - SELECT TESTS ORDERED
Results in MD note/COVID-19 serum preg negative on 03/03/2022 as per  chart/HCG/Results in MD note/COVID-19

## 2022-03-04 NOTE — CHART NOTE - NSCHARTNOTEFT_GEN_A_CORE
Anesthesia Post Op Assessment  		(    ) Intubated           TV _____	Rate __sv___	FiO2__4lnc___  		(  x  ) Patent airway. Full return of protective reflexes  		(  x  )Full recovery from anesthesia/sedation to baseline status      Cardiovascular Function:  		BP:	127/73	                  Pulse:		101                  RR:		12                  Temp:		97.8                  O2Sat:    98      Mental Status:  	        (  x  ) awake		  (  x  ) alert		 (    ) drowsy	               (    ) sedated      Nausea/Vomiting:  		(    ) Yes, See post-op orders		   ( x   ) No      Pain Scale: (0-10):			Treatment:     (    ) None	            (  x  ) See Post-Op/PCA Orders      Post-operative Fluids: 	   (    ) Oral	          (   x ) See post-op Orders        Comments:    Uneventful. No complications from anesthesia.  Discharge when criteria met.

## 2022-03-04 NOTE — BRIEF OPERATIVE NOTE - OPERATION/FINDINGS
large abdominal hematoma located inferior and lateral to previous gastric sleeve staple line. 1L of clot evacuated and drain left large abdominal hematoma located inferior and lateral to previous gastric sleeve staple line. 1.5L of clot evacuated and drain left

## 2022-03-04 NOTE — CHART NOTE - NSCHARTNOTEFT_GEN_A_CORE
Post Operative Note  Patient: BETSY LUCERO 41y (1980) Female   MRN: [id number]  Location: [address]  Visit: 03-03-22 Inpatient  Date: 03-04-22 @ 22:49    Procedure:     S/P Diagnostic laparoscopy    Evacuation of hematoma of abdomen        Subjective:   Nausea:  no, Vomiting: no, Ambulating:  no, Flatus:  no  Pain Assessment: Patient is complaining of mild pain that is appropriate for post-operative course.     Objective:  Vitals: T(F): 97.1 (03-04-22 @ 20:30), Max: 98.8 (03-04-22 @ 13:01)  HR: 90 (03-04-22 @ 20:30)  BP: 110/65 (03-04-22 @ 20:30) (100/55 - 132/77)  RR: 17 (03-04-22 @ 20:30)  SpO2: 96% (03-04-22 @ 20:30)  Vent Settings:     In:   03-03-22 @ 07:01  -  03-04-22 @ 07:00  --------------------------------------------------------  IN: 1375 mL    03-04-22 @ 07:01  -  03-04-22 @ 22:49  --------------------------------------------------------  IN: 250 mL      IV Fluids: lactated ringers. 1000 milliLiter(s) (75 mL/Hr) IV Continuous <Continuous>  lactated ringers. 1000 milliLiter(s) (125 mL/Hr) IV Continuous <Continuous>      Out:   03-03-22 @ 07:01  -  03-04-22 @ 07:00  --------------------------------------------------------  OUT: 700 mL    03-04-22 @ 07:01  -  03-04-22 @ 22:49  --------------------------------------------------------  OUT: 1870 mL      EBL:     Voided Urine:   03-03-22 @ 07:01  -  03-04-22 @ 07:00  --------------------------------------------------------  OUT: 700 mL    03-04-22 @ 07:01  -  03-04-22 @ 22:49  --------------------------------------------------------  OUT: 1870 mL    Drains:   GOOD:   03-04-22 @ 07:01  -  03-04-22 @ 22:49  --------------------------------------------------------  OUT: 70 mL          Physical Examination:  General Appearance: NAD,   HEENT: EOMI, sclera non-icteric.  Heart: RRR  Lungs: CTABL  Abdomen:  Soft, non tender, appropriate for post-op course, nondistended. No rigidity, guarding, or rebound tenderness.   MSK/Extremities: Warm & well-perfused. Peripheral pulses intact.  Skin: Warm, dry. No jaundice.   Incisions/Wounds:  clean, dry and intact, no signs of infection/active bleeding/drainage    Medications: [Standing]  acetaminophen     Tablet .. 650 milliGRAM(s) Oral every 6 hours PRN  BUpivacaine liposome 1.3% Injectable 20 milliLiter(s) Local Injection once  enoxaparin Injectable 40 milliGRAM(s) SubCutaneous every 12 hours  HYDROmorphone  Injectable 0.5 milliGRAM(s) IV Push every 10 minutes PRN  lactated ringers. 1000 milliLiter(s) IV Continuous <Continuous>  lactated ringers. 1000 milliLiter(s) IV Continuous <Continuous>  morphine  - Injectable 2 milliGRAM(s) IV Push every 15 minutes PRN  ondansetron Injectable 4 milliGRAM(s) IV Push once PRN  oxycodone    5 mG/acetaminophen 325 mG 1 Tablet(s) Oral once PRN    Medications: [PRN]  acetaminophen     Tablet .. 650 milliGRAM(s) Oral every 6 hours PRN  BUpivacaine liposome 1.3% Injectable 20 milliLiter(s) Local Injection once  enoxaparin Injectable 40 milliGRAM(s) SubCutaneous every 12 hours  HYDROmorphone  Injectable 0.5 milliGRAM(s) IV Push every 10 minutes PRN  lactated ringers. 1000 milliLiter(s) IV Continuous <Continuous>  lactated ringers. 1000 milliLiter(s) IV Continuous <Continuous>  morphine  - Injectable 2 milliGRAM(s) IV Push every 15 minutes PRN  ondansetron Injectable 4 milliGRAM(s) IV Push once PRN  oxycodone    5 mG/acetaminophen 325 mG 1 Tablet(s) Oral once PRN      DVT PROPHYLAXIS: enoxaparin Injectable 40 milliGRAM(s) SubCutaneous every 12 hours    GI PROPHYLAXIS:   ANTICOAGULATION:   ANTIBIOTICS:        Labs:                        8.4    14.22 )-----------( 394      ( 03 Mar 2022 20:00 )             27.1     03-03    139  |  104  |  8<L>  ----------------------------<  124<H>  4.6   |  23  |  0.7    Ca    8.5      03 Mar 2022 20:00  Phos  2.9     03-03  Mg     2.1     03-03    TPro  6.4  /  Alb  3.8  /  TBili  0.8  /  DBili  x   /  AST  16  /  ALT  20  /  AlkPhos  89  03-03    PT/INR - ( 03 Mar 2022 11:55 )   PT: 14.70 sec;   INR: 1.28 ratio         PTT - ( 03 Mar 2022 11:55 )  PTT:28.1 sec      Plan:    - Monitor vitals  - F/U drain output  - Monitor post-op labs and replete as necessary  - Monitor for bowel function  - Continue Pain Medications   - Encourage ambulation as tolerated  - Monitor urine output   - DVT and GI Prophylaxis  - Monitor wound for changes.      Date/Time: 03-04-22 @ 22:49

## 2022-03-04 NOTE — PROGRESS NOTE ADULT - SUBJECTIVE AND OBJECTIVE BOX
BARIATRIC SURGERY PROGRESS NOTE    Patient: BETSY LUCERO , 41y ([birth year])Female   MRN: [id number]  Location: [address]  Visit: 03-03-22 Inpatient  Date: 03-04-22 @ 10:10    Hospital Day #: 2    Procedure/Dx/Injuries: Hematoma S/P Robotic Sleeve Gastrectomy    Events of past 24 hours: HB 8.4 after 2 PRBCs.    PAST MEDICAL & SURGICAL HISTORY:  NETTIE (obstructive sleep apnea)    Obesity    History of surgery  lap odessa  d&amp;c      History of sleeve gastrectomy        Vitals:   T(F): 98.1 (03-04-22 @ 08:56), Max: 99.9 (03-03-22 @ 11:12)  HR: 87 (03-04-22 @ 08:56)  BP: 129/58 (03-04-22 @ 08:56)  RR: 18 (03-04-22 @ 08:56)  SpO2: 100% (03-04-22 @ 08:56)      Diet, NPO after Midnight:      NPO Start Date: 03-Mar-2022,   NPO Start Time: 23:59  Except Medications  Diet, Clear Liquid:   Bariatric Clear Liquid (BARICLLIQ)      Fluids: lactated ringers.: Solution, 1000 milliLiter(s) infuse at 125 mL/Hr      I & O's:    03-03-22 @ 07:01  -  03-04-22 @ 07:00  --------------------------------------------------------  IN:    Lactated Ringers: 1375 mL  Total IN: 1375 mL    OUT:    Voided (mL): 700 mL  Total OUT: 700 mL    Total NET: 675 mL    PHYSICAL EXAM:   General Appearance: NAD  HEENT: Normocephalice  Heart: reg tachy   Lungs: No increased work of breathing or accessory muscle use.   Abdomen:  Soft, mod ttp epigastrium/LUQ, ecchymosis a/w trochar incisions  MSK/Extremities: Warm & well-perfused.   Skin: Warm, dry. No jaundice.   Incision/wound: healing well, clean, dry and intact- with ecchymosis surrounding bilateral wounds    MEDICATIONS  (STANDING):  lactated ringers. 1000 milliLiter(s) (125 mL/Hr) IV Continuous <Continuous>    MEDICATIONS  (PRN):  acetaminophen     Tablet .. 650 milliGRAM(s) Oral every 6 hours PRN Mild Pain (1 - 3)      DVT PROPHYLAXIS:   GI PROPHYLAXIS:   ANTICOAGULATION:   ANTIBIOTICS:            LAB/STUDIES:  Labs:  CAPILLARY BLOOD GLUCOSE                              8.4    14.22 )-----------( 394      ( 03 Mar 2022 20:00 )             27.1       Auto Neutrophil %: 74.8 % (03-03-22 @ 20:00)  Auto Immature Granulocyte %: 1.9 % (03-03-22 @ 20:00)  Auto Neutrophil %: 84.6 % (03-03-22 @ 11:55)    03-03    139  |  104  |  8<L>  ----------------------------<  124<H>  4.6   |  23  |  0.7      Calcium, Total Serum: 8.5 mg/dL (03-03-22 @ 20:00)      LFTs:             6.4  | 0.8  | 16       ------------------[89      ( 03 Mar 2022 11:55 )  3.8  | x    | 20          Lipase:65     Amylase:x             Coags:     14.70  ----< 1.28    ( 03 Mar 2022 11:55 )     28.1        IMAGING:  CT Abd/pelvis:     1. Post gastric sleeve surgery with perigastric 17.7 x 15.6 x 10 cm hematoma.    2. IUD present, exact location difficult to determine, however IUD appears low, with arm possibly penetrating myometrium on left.

## 2022-03-04 NOTE — BRIEF OPERATIVE NOTE - NSICDXBRIEFPROCEDURE_GEN_ALL_CORE_FT
PROCEDURES:  Diagnostic laparoscopy 04-Mar-2022 18:25:49  Nicolas [place]  Evacuation of hematoma of abdomen 04-Mar-2022 18:26:37  Nicolas [place]

## 2022-03-05 ENCOUNTER — TRANSCRIPTION ENCOUNTER (OUTPATIENT)
Age: 42
End: 2022-03-05

## 2022-03-05 VITALS
TEMPERATURE: 98 F | RESPIRATION RATE: 16 BRPM | OXYGEN SATURATION: 97 % | DIASTOLIC BLOOD PRESSURE: 57 MMHG | SYSTOLIC BLOOD PRESSURE: 101 MMHG | HEART RATE: 97 BPM

## 2022-03-05 LAB
BASOPHILS # BLD AUTO: 0.02 K/UL — SIGNIFICANT CHANGE UP (ref 0–0.2)
BASOPHILS NFR BLD AUTO: 0.1 % — SIGNIFICANT CHANGE UP (ref 0–1)
EOSINOPHIL # BLD AUTO: 0.05 K/UL — SIGNIFICANT CHANGE UP (ref 0–0.7)
EOSINOPHIL NFR BLD AUTO: 0.4 % — SIGNIFICANT CHANGE UP (ref 0–8)
HCT VFR BLD CALC: 30.4 % — LOW (ref 37–47)
HGB BLD-MCNC: 9.3 G/DL — LOW (ref 12–16)
IMM GRANULOCYTES NFR BLD AUTO: 1 % — HIGH (ref 0.1–0.3)
LYMPHOCYTES # BLD AUTO: 16.3 % — LOW (ref 20.5–51.1)
LYMPHOCYTES # BLD AUTO: 2.26 K/UL — SIGNIFICANT CHANGE UP (ref 1.2–3.4)
MCHC RBC-ENTMCNC: 27.8 PG — SIGNIFICANT CHANGE UP (ref 27–31)
MCHC RBC-ENTMCNC: 30.6 G/DL — LOW (ref 32–37)
MCV RBC AUTO: 90.7 FL — SIGNIFICANT CHANGE UP (ref 81–99)
MONOCYTES # BLD AUTO: 0.93 K/UL — HIGH (ref 0.1–0.6)
MONOCYTES NFR BLD AUTO: 6.7 % — SIGNIFICANT CHANGE UP (ref 1.7–9.3)
NEUTROPHILS # BLD AUTO: 10.5 K/UL — HIGH (ref 1.4–6.5)
NEUTROPHILS NFR BLD AUTO: 75.5 % — HIGH (ref 42.2–75.2)
NRBC # BLD: 0 /100 WBCS — SIGNIFICANT CHANGE UP (ref 0–0)
PLATELET # BLD AUTO: 436 K/UL — HIGH (ref 130–400)
RBC # BLD: 3.35 M/UL — LOW (ref 4.2–5.4)
RBC # FLD: 16 % — HIGH (ref 11.5–14.5)
WBC # BLD: 13.9 K/UL — HIGH (ref 4.8–10.8)
WBC # FLD AUTO: 13.9 K/UL — HIGH (ref 4.8–10.8)

## 2022-03-05 PROCEDURE — 99024 POSTOP FOLLOW-UP VISIT: CPT

## 2022-03-05 RX ORDER — ACETAMINOPHEN 500 MG
2 TABLET ORAL
Qty: 0 | Refills: 0 | DISCHARGE
Start: 2022-03-05

## 2022-03-05 RX ADMIN — Medication 650 MILLIGRAM(S): at 11:56

## 2022-03-05 RX ADMIN — OXYCODONE AND ACETAMINOPHEN 1 TABLET(S): 5; 325 TABLET ORAL at 04:46

## 2022-03-05 RX ADMIN — ENOXAPARIN SODIUM 40 MILLIGRAM(S): 100 INJECTION SUBCUTANEOUS at 05:08

## 2022-03-05 NOTE — DISCHARGE NOTE PROVIDER - CARE PROVIDER_API CALL
Linda Hodge)  [facility]  [address] [place] [address], [address] [place] [address]  Phone: [phone]  Fax: [phone]  Follow Up Time:

## 2022-03-05 NOTE — PROGRESS NOTE ADULT - ASSESSMENT
ASSESSMENT:  41y F S/P diagnostic laparoscopy and evaluation of hematoma of abdomen     Plan:    - Monitor vitals  - Diet Lionel Soft  - F/U drain output  - No Postop labs  - Monitor for bowel function  - Continue Pain Medications   - Encourage ambulation as tolerated  - Monitor urine output   - DVT and GI Prophylaxis  - Monitor wound for changes.    BLUE TEAM SPECTRA: [id number]      
42 yo female s/p robotic sleeve gastrectomy on 2/22 now with intraabdominal hematoma, acute blood loss anemia    Plan:  -OR 3/4 for diagnostic laparoscopy with evacuation of hematoma   -PREOP Labs  -Monitor vitals and F/U CBC  -PRN pain, nausea meds    Blue team [id number]

## 2022-03-05 NOTE — PROGRESS NOTE ADULT - SUBJECTIVE AND OBJECTIVE BOX
GENERAL SURGERY PROGRESS NOTE    Patient: BETSY LUCERO , 41y ([birth year])Female   MRN: [id number]  Location: [address]  Visit: 03-03-22 Inpatient  Date: 03-05-22 @ 01:33    Hospital Day #: 2  Post-Op Day #:1    Procedure/Dx/Injuries: S/P diagnostic laparoscopy and evaluation of hematoma of abdomen     Events of past 24 hours: No acute events    PAST MEDICAL & SURGICAL HISTORY:  NETTIE (obstructive sleep apnea)    Obesity    History of surgery  lap odessa  d&amp;c      History of sleeve gastrectomy        Vitals:   T(F): 97.6 (03-05-22 @ 00:51), Max: 98.8 (03-04-22 @ 13:01)  HR: 85 (03-05-22 @ 00:51)  BP: 108/56 (03-05-22 @ 00:51)  RR: 18 (03-05-22 @ 00:51)  SpO2: 100% (03-05-22 @ 00:51)      Diet, Regular:   Phase 2 Bariatric Pureed/Soft ([id number])      Fluids: lactated ringers.: Solution, 1000 milliLiter(s) infuse at 125 mL/Hr  lactated ringers.: Solution, 1000 milliLiter(s) infuse at 75 mL/Hr  Provider's Contact #: [phone]      I & O's:    03-03-22 @ 07:01  -  03-04-22 @ 07:00  --------------------------------------------------------  IN:    Lactated Ringers: 1375 mL  Total IN: 1375 mL    OUT:    Voided (mL): 700 mL  Total OUT: 700 mL    Total NET: 675 mL      Physical Examination:  General Appearance: NAD,   HEENT: EOMI, sclera non-icteric.  Heart: RRR  Lungs: CTABL  Abdomen:  Soft, non tender, appropriate for post-op course, nondistended. No rigidity, guarding, or rebound tenderness. Drain in place.  MSK/Extremities: Warm & well-perfused. Peripheral pulses intact.  Skin: Warm, dry. No jaundice.   Incisions/Wounds:  clean, dry and intact, no signs of infection/active bleeding/drainage    MEDICATIONS  (STANDING):  BUpivacaine liposome 1.3% Injectable 20 milliLiter(s) Local Injection once  enoxaparin Injectable 40 milliGRAM(s) SubCutaneous every 12 hours  lactated ringers. 1000 milliLiter(s) (75 mL/Hr) IV Continuous <Continuous>  lactated ringers. 1000 milliLiter(s) (125 mL/Hr) IV Continuous <Continuous>    MEDICATIONS  (PRN):  acetaminophen     Tablet .. 650 milliGRAM(s) Oral every 6 hours PRN Mild Pain (1 - 3)  HYDROmorphone  Injectable 0.5 milliGRAM(s) IV Push every 10 minutes PRN Severe Pain (7 - 10)  morphine  - Injectable 2 milliGRAM(s) IV Push every 15 minutes PRN Moderate Pain (4 - 6)  ondansetron Injectable 4 milliGRAM(s) IV Push once PRN Nausea and/or Vomiting  oxycodone    5 mG/acetaminophen 325 mG 1 Tablet(s) Oral once PRN Mild Pain (1 - 3)      DVT PROPHYLAXIS: enoxaparin Injectable 40 milliGRAM(s) SubCutaneous every 12 hours    GI PROPHYLAXIS:   ANTICOAGULATION:   ANTIBIOTICS:            LAB/STUDIES:  Labs:  CAPILLARY BLOOD GLUCOSE                              8.4    14.22 )-----------( 394      ( 03 Mar 2022 20:00 )             27.1         03-03    139  |  104  |  8<L>  ----------------------------<  124<H>  4.6   |  23  |  0.7          LFTs:             6.4  | 0.8  | 16       ------------------[89      ( 03 Mar 2022 11:55 )  3.8  | x    | 20          Lipase:65     Amylase:x             Coags:     14.70  ----< 1.28    ( 03 Mar 2022 11:55 )     28.1       GENERAL SURGERY PROGRESS NOTE    Patient: BETSY LUCERO , 41y ([birth year])Female   MRN: [id number]  Location: [address]  Visit: 03-03-22 Inpatient  Date: 03-05-22 @ 01:33    Hospital Day #: 2  Post-Op Day #:1    Procedure/Dx/Injuries: S/P diagnostic laparoscopy and evacuation of hematoma of abdomen     Events of past 24 hours: No acute events    PAST MEDICAL & SURGICAL HISTORY:  NETTIE (obstructive sleep apnea)    Obesity    History of surgery  lap odessa  d&amp;c      History of sleeve gastrectomy        Vitals:   T(F): 97.6 (03-05-22 @ 00:51), Max: 98.8 (03-04-22 @ 13:01)  HR: 85 (03-05-22 @ 00:51)  BP: 108/56 (03-05-22 @ 00:51)  RR: 18 (03-05-22 @ 00:51)  SpO2: 100% (03-05-22 @ 00:51)      Diet, Regular:   Phase 2 Bariatric Pureed/Soft ([id number])      Fluids: lactated ringers.: Solution, 1000 milliLiter(s) infuse at 125 mL/Hr  lactated ringers.: Solution, 1000 milliLiter(s) infuse at 75 mL/Hr  Provider's Contact #: [phone]      I & O's:    03-03-22 @ 07:01  -  03-04-22 @ 07:00  --------------------------------------------------------  IN:    Lactated Ringers: 1375 mL  Total IN: 1375 mL    OUT:    Voided (mL): 700 mL  Total OUT: 700 mL    Total NET: 675 mL      Physical Examination:  General Appearance: NAD,   HEENT: EOMI, sclera non-icteric.  Heart: RRR  Lungs: CTABL  Abdomen:  Soft, non tender, appropriate for post-op course, nondistended. No rigidity, guarding, or rebound tenderness. Drain in place.  MSK/Extremities: Warm & well-perfused. Peripheral pulses intact.  Skin: Warm, dry. No jaundice.   Incisions/Wounds:  clean, dry and intact, no signs of infection/active bleeding/drainage    MEDICATIONS  (STANDING):  BUpivacaine liposome 1.3% Injectable 20 milliLiter(s) Local Injection once  enoxaparin Injectable 40 milliGRAM(s) SubCutaneous every 12 hours  lactated ringers. 1000 milliLiter(s) (75 mL/Hr) IV Continuous <Continuous>  lactated ringers. 1000 milliLiter(s) (125 mL/Hr) IV Continuous <Continuous>    MEDICATIONS  (PRN):  acetaminophen     Tablet .. 650 milliGRAM(s) Oral every 6 hours PRN Mild Pain (1 - 3)  HYDROmorphone  Injectable 0.5 milliGRAM(s) IV Push every 10 minutes PRN Severe Pain (7 - 10)  morphine  - Injectable 2 milliGRAM(s) IV Push every 15 minutes PRN Moderate Pain (4 - 6)  ondansetron Injectable 4 milliGRAM(s) IV Push once PRN Nausea and/or Vomiting  oxycodone    5 mG/acetaminophen 325 mG 1 Tablet(s) Oral once PRN Mild Pain (1 - 3)      DVT PROPHYLAXIS: enoxaparin Injectable 40 milliGRAM(s) SubCutaneous every 12 hours    GI PROPHYLAXIS:   ANTICOAGULATION:   ANTIBIOTICS:            LAB/STUDIES:  Labs:  CAPILLARY BLOOD GLUCOSE                              8.4    14.22 )-----------( 394      ( 03 Mar 2022 20:00 )             27.1         03-03    139  |  104  |  8<L>  ----------------------------<  124<H>  4.6   |  23  |  0.7          LFTs:             6.4  | 0.8  | 16       ------------------[89      ( 03 Mar 2022 11:55 )  3.8  | x    | 20          Lipase:65     Amylase:x             Coags:     14.70  ----< 1.28    ( 03 Mar 2022 11:55 )     28.1

## 2022-03-05 NOTE — DISCHARGE NOTE NURSING/CASE MANAGEMENT/SOCIAL WORK - NSDCPEFALRISK_GEN_ALL_CORE
For information on Fall & Injury Prevention, visit: [email or web address] OR  [email or web address] OR  https://www.cdc.gov/steadi/patient.html

## 2022-03-05 NOTE — DISCHARGE NOTE PROVIDER - NSDCFUADDINST_GEN_ALL_CORE_FT
Follow up with Dr Hodge in 1 week call office for appointment   follow up with your PMD     no strenuous activity  keep wound clean and dry  no tub bath  continue drain care    if experience fevers, shortness of breath, chest pain , dizziness, vomiting , bleeding or drainage from wound call MD or return to ED

## 2022-03-05 NOTE — DISCHARGE NOTE NURSING/CASE MANAGEMENT/SOCIAL WORK - PATIENT PORTAL LINK FT
You can access the FollowMyHealth Patient Portal offered by [facility] by registering at the following website: [email or web address]. By joining [employer]’s FollowMyHealth portal, you will also be able to view your health information using other applications (apps) compatible with our system.

## 2022-03-05 NOTE — DISCHARGE NOTE PROVIDER - HOSPITAL COURSE
This is a 40yo female with PMHx of class III obesity s/p robotic sleeve gastrectomy 2/22/2022 presenting with weakness, palpitations and drop in Hb. She was discharged home on 2/23 POD#1 on Lovenox. On exam, patient is tachycardic 115, extensive subcutaneous hematomas and LUQ tenderness without peritonitis. Labs significant for Hb 6.6 from 13. CT A/P demonstrates [id number] hematoma posterior stomach. IR unable to drain hematoma. pt underwent diagnostic laparoscopy evacuation of hematoma on 3/4/22.  Post operatively pt treated medically and diet advanced as tolerated.   On 3/5/2022 pt without complaints. tolerated po diet, voided and ambulated. vital signs stable and afebrile. pt doing well and discharged home   in stable condition. pt advised to follow up with Dr Hodge in 1 week, continue drain care. Resume home medications. precaution provided.

## 2022-03-05 NOTE — DISCHARGE NOTE PROVIDER - NSDCCPCAREPLAN_GEN_ALL_CORE_FT
PRINCIPAL DISCHARGE DIAGNOSIS  Diagnosis: Abdominal hematoma  Assessment and Plan of Treatment:

## 2022-03-05 NOTE — DISCHARGE NOTE PROVIDER - NSDCCPTREATMENT_GEN_ALL_CORE_FT
PRINCIPAL PROCEDURE  Procedure: Diagnostic laparoscopy  Findings and Treatment:       SECONDARY PROCEDURE  Procedure: Evacuation of hematoma of abdomen  Findings and Treatment:

## 2022-03-05 NOTE — PROGRESS NOTE ADULT - ATTENDING COMMENTS
40yo female with PMHx of class III obesity s/p robotic sleeve gastrectomy 2/22/2022 admitted for acute blood loss anemia and intra-abdominal hematoma. Received 2U pRBC over night. States she is feeling a little better this morning. On exam, patient has diffuse ecchymosis of the abdominal wall and LUQ tenderness without peritonitis. Labs - Hb 8.4 from 6.6. CXR - reviewed. Plan - OR today for diagnostic laparoscopy and evacuation of hematoma. NPO, hold anticoagulation. Encourage ambulation/OOB, incentive spirometer, SCD's.
42yo female s/p robotic sleeve gastrectomy 2/22/22 complicated by acute blood loss anemia with intra-abdominal hematoma s/p laparoscopic evacuation of hematoma POD#1. Doing well. Pain improved. Tolerating puree diet. Denies fever/chills, chest pain or shortness of breath. On exam, incisions healing well, GOOD drain in place with serosanginous output. Will discharge home today with drain instructions and follow up in outpatient.

## 2022-03-09 ENCOUNTER — APPOINTMENT (OUTPATIENT)
Dept: SURGERY | Facility: CLINIC | Age: 42
End: 2022-03-09
Payer: COMMERCIAL

## 2022-03-09 VITALS
BODY MASS INDEX: 49.13 KG/M2 | HEIGHT: 62 IN | WEIGHT: 267 LBS | SYSTOLIC BLOOD PRESSURE: 117 MMHG | TEMPERATURE: 96.8 F | OXYGEN SATURATION: 99 % | HEART RATE: 74 BPM | DIASTOLIC BLOOD PRESSURE: 77 MMHG

## 2022-03-09 DIAGNOSIS — D62 ACUTE POSTHEMORRHAGIC ANEMIA: ICD-10-CM

## 2022-03-09 DIAGNOSIS — G47.33 OBSTRUCTIVE SLEEP APNEA (ADULT) (PEDIATRIC): ICD-10-CM

## 2022-03-09 DIAGNOSIS — K91.870 POSTPROCEDURAL HEMATOMA OF A DIGESTIVE SYSTEM ORGAN OR STRUCTURE FOLLOWING A DIGESTIVE SYSTEM PROCEDURE: ICD-10-CM

## 2022-03-09 DIAGNOSIS — L76.32 POSTPROCEDURAL HEMATOMA OF SKIN AND SUBCUTANEOUS TISSUE FOLLOWING OTHER PROCEDURE: ICD-10-CM

## 2022-03-09 DIAGNOSIS — E66.01 MORBID (SEVERE) OBESITY DUE TO EXCESS CALORIES: ICD-10-CM
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
## 2022-03-09 DIAGNOSIS — Z82.49 FAMILY HISTORY OF ISCHEMIC HEART DISEASE AND OTHER DISEASES OF THE CIRCULATORY SYSTEM: ICD-10-CM

## 2022-03-09 PROCEDURE — 99024 POSTOP FOLLOW-UP VISIT: CPT

## 2022-03-10 NOTE — PHYSICAL EXAM
[de-identified] : soft, non-tender, no rebound/guarding, no masses/hernias, wounds without erythema, drainage, or induration, GOOD drain in place with serosanginous output

## 2022-03-10 NOTE — HISTORY OF PRESENT ILLNESS
[Procedure: ___] : Procedure performed: [unfilled]  [Date of Surgery: ___] : Date of Surgery:   [unfilled] [Surgeon Name:   ___] : Surgeon Name: Dr. BARNES [___ Days Post Op] : [unfilled] days  [de-identified] : 42yo female s/p robotic sleeve gastrectomy 2/22/22 complicated by post-operative intra-abdominal bleeding s/p diagnostic laparoscopy, evacuation of hematoma 3/6. Reports resolved epigastric/LUQ pain but now has flank and subcostal discomfort. Denies nausea/vomiting, fever/chills, chest pain. GOOD drain putting out 50cc/day, dark serosanginous. Ambulating. Drinking 2 protein shakes per day. Continuing liquid diet for now as that is what she feels comfortable with. [Phase 1] : Phase 1 [Walking] : walking

## 2022-03-16 ENCOUNTER — APPOINTMENT (OUTPATIENT)
Dept: SURGERY | Facility: CLINIC | Age: 42
End: 2022-03-16
Payer: COMMERCIAL

## 2022-03-16 VITALS
OXYGEN SATURATION: 98 % | TEMPERATURE: 97.3 F | HEART RATE: 92 BPM | SYSTOLIC BLOOD PRESSURE: 118 MMHG | HEIGHT: 62 IN | DIASTOLIC BLOOD PRESSURE: 72 MMHG | BODY MASS INDEX: 48.47 KG/M2 | WEIGHT: 263.4 LBS

## 2022-03-16 PROCEDURE — 99024 POSTOP FOLLOW-UP VISIT: CPT

## 2022-03-16 RX ORDER — ERGOCALCIFEROL 1.25 MG/1
1.25 MG CAPSULE, LIQUID FILLED ORAL
Qty: 4 | Refills: 3 | Status: ACTIVE | COMMUNITY
Start: 2022-03-16 | End: 1900-01-01

## 2022-03-16 NOTE — HISTORY OF PRESENT ILLNESS
[Procedure: ___] : Procedure performed: [unfilled]  [Date of Surgery: ___] : Date of Surgery:   [unfilled] [Surgeon Name:   ___] : Surgeon Name: Dr. BARNES [___ Days Post Op] : [unfilled] days  [Phase 2] : Phase 2 [de-identified] : 42yo female s/p robotic sleeve gastrectomy 2/22/22 complicated by post-operative intra-abdominal bleeding s/p diagnostic laparoscopy, evacuation of hematoma 3/6/22. Patient reports feeling well overall. GOOD drain is bothering her. She states about 5cc/day serosangious drainage. Denies fever/chills, nausea/vomiting, chest pain or shortness of breath. Ambulating. Drinking 2 protein shakes per day. Starting more solid foods. Taking multivitamin, calcium and B12. Drinking enough water - 3-16oz bottles of water.

## 2022-03-16 NOTE — PHYSICAL EXAM
[de-identified] : soft, non-tender, no rebound/guarding, no masses/hernias, wounds without erythema, drainage, or induration; ecchymosis resolved, GOOD drain in place with serosanginous fluid

## 2022-03-16 NOTE — ASSESSMENT
[___ Days Post Op] : [unfilled] days [de-identified] : 42yo female s/p robotic sleeve gastrectomy 2/22/22 complicated by post-operative intra-abdominal bleeding s/p diagnostic laparoscopy, evacuation of hematoma 3/6/22. \par -GOOD drain removed in office\par -Tolerating diet - advance as tolerated\par -continue protein intake - 70g per day\par -continue water intake - goal of 60oz per day\par -continue PPI\par -continue MV, calcium and B12\par -medications reviewed and amended\par -continue light activity\par -return to office in 1 month \par -call with concerns

## 2022-03-21 RX ORDER — GABAPENTIN 250 MG/5ML
250 SOLUTION ORAL EVERY 8 HOURS
Qty: 15 | Refills: 0 | Status: DISCONTINUED | COMMUNITY
Start: 2022-02-28 | End: 2022-03-21

## 2022-04-11 RX ORDER — ENOXAPARIN SODIUM 100 MG/ML
40 INJECTION SUBCUTANEOUS DAILY
Qty: 14 | Refills: 0 | Status: DISCONTINUED | COMMUNITY
Start: 2022-02-15 | End: 2022-04-11

## 2022-04-12 ENCOUNTER — NON-APPOINTMENT (OUTPATIENT)
Age: 42
End: 2022-04-12

## 2022-04-12 DIAGNOSIS — Z87.898 PERSONAL HISTORY OF OTHER SPECIFIED CONDITIONS: ICD-10-CM

## 2022-04-13 ENCOUNTER — APPOINTMENT (OUTPATIENT)
Dept: SURGERY | Facility: CLINIC | Age: 42
End: 2022-04-13
Payer: SELF-PAY

## 2022-04-13 VITALS
TEMPERATURE: 97.5 F | BODY MASS INDEX: 47.11 KG/M2 | OXYGEN SATURATION: 99 % | SYSTOLIC BLOOD PRESSURE: 110 MMHG | HEIGHT: 62 IN | DIASTOLIC BLOOD PRESSURE: 70 MMHG | HEART RATE: 77 BPM | WEIGHT: 256 LBS

## 2022-04-13 DIAGNOSIS — E66.01 MORBID (SEVERE) OBESITY DUE TO EXCESS CALORIES: ICD-10-CM

## 2022-04-13 PROCEDURE — 99024 POSTOP FOLLOW-UP VISIT: CPT

## 2022-04-26 PROBLEM — E66.01 OBESITY, CLASS III, BMI 40-49.9 (MORBID OBESITY): Status: ACTIVE | Noted: 2022-04-26

## 2022-04-26 NOTE — PHYSICAL EXAM
[de-identified] : soft, non-tender, no rebound/guarding, no masses/hernias, wounds without erythema, drainage, or induration; ecchymosis resolved

## 2022-04-26 NOTE — ASSESSMENT
[___ Months Post Op] : [unfilled] months [de-identified] : 43yo female s/p robotic sleeve gastrectomy 2/22/22 complicated by post-operative intra-abdominal bleeding s/p diagnostic laparoscopy, evacuation of hematoma 3/6/22. \par -GOOD drain removed in office at previous visit\par -Tolerating diet - advance as tolerated\par -continue protein intake - 70g per day\par -continue water intake - goal of 60oz per day\par -continue PPI\par -continue MV, calcium and B12\par -medications reviewed and amended\par -cleared for full activity\par -labs ordered to be done prior to next visit\par -return to office in 2 months \par -call with concerns

## 2022-04-26 NOTE — REASON FOR VISIT
[de-identified] : 41yo female s/p robotic sleeve gastrectomy 2/22/22 complicated by post-operative bleeding s/p laparoscopic evacuation of intra-abdominal hematoma 3/4/22. States that she is doing well. Good energy level, sleeping well. Staying away from red meat and lettuce. Eating lots of vegetables, chicken and fish. Drinking 1-2 protein shakes per day. Drinking plenty of water. Ambulating well. +bowel function. No reflux symptoms. Taking PPI, MV, calcium and B12.

## 2022-05-24 ENCOUNTER — NON-APPOINTMENT (OUTPATIENT)
Age: 42
End: 2022-05-24

## 2022-06-10 ENCOUNTER — APPOINTMENT (OUTPATIENT)
Dept: SURGERY | Facility: CLINIC | Age: 42
End: 2022-06-10

## 2022-07-22 ENCOUNTER — APPOINTMENT (OUTPATIENT)
Dept: SURGERY | Facility: CLINIC | Age: 42
End: 2022-07-22

## 2022-10-11 NOTE — DISCHARGE NOTE NURSING/CASE MANAGEMENT/SOCIAL WORK - NSFLUVACAGEDISCH_IMM_ALL_CORE
Adult [Menstruating] : The patient is menstruating [Definite ___ (Date)] : the last menstrual period was [unfilled]

## 2023-01-27 ENCOUNTER — NON-APPOINTMENT (OUTPATIENT)
Age: 43
End: 2023-01-27

## 2023-02-08 ENCOUNTER — APPOINTMENT (OUTPATIENT)
Dept: SURGERY | Facility: CLINIC | Age: 43
End: 2023-02-08
Payer: COMMERCIAL

## 2023-02-08 VITALS
WEIGHT: 218 LBS | SYSTOLIC BLOOD PRESSURE: 130 MMHG | BODY MASS INDEX: 40.12 KG/M2 | TEMPERATURE: 97.4 F | OXYGEN SATURATION: 98 % | DIASTOLIC BLOOD PRESSURE: 80 MMHG | HEART RATE: 72 BPM | HEIGHT: 62 IN

## 2023-02-08 PROCEDURE — 99214 OFFICE O/P EST MOD 30 MIN: CPT

## 2023-02-08 NOTE — PHYSICAL EXAM
[Normal] : affect appropriate [de-identified] : excess skin hanging from pannus [de-identified] : large amount of excess skin hanging from upper arms

## 2023-02-08 NOTE — HISTORY OF PRESENT ILLNESS
[de-identified] : 43 yo female s/p robo sleeve 2/22/22 complicated by post-op hematoma that was evacuated. She has not followed up regularly, and has not had labs checked since the surgery. She is still taking her vitamins and is doing well with her diet. She has lost 60 lbs. she has good energy. She is able to eat all textures except sometimes red meat gives her loose stools. \par Her main complaints is the painful, sagging skin that is on her upper arms and on her pannus. It is causing significant discomfort. She would like a referral for removal. She has been at a stable weight for several months and has plateaued at this weight.

## 2023-03-06 ENCOUNTER — APPOINTMENT (OUTPATIENT)
Dept: PLASTIC SURGERY | Facility: CLINIC | Age: 43
End: 2023-03-06
Payer: COMMERCIAL

## 2023-03-06 VITALS — BODY MASS INDEX: 38.27 KG/M2 | HEIGHT: 63 IN | WEIGHT: 216 LBS

## 2023-03-06 PROCEDURE — 99203 OFFICE O/P NEW LOW 30 MIN: CPT

## 2023-03-06 NOTE — HISTORY OF PRESENT ILLNESS
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 Patient reports history of robotic gastric sleeve in Feb 2022 (complicated by post-op hematoma evac) with Dr. Hodge at [facility] with 85lbs weight loss (Highest wt 290 ->216lbs). Weight stable for approx 6 months, desires additional 16lbs however has had difficulty achieving wt loss despite exercising 3x/week & following bariatric diet restrictions. Overhanging abdominal pannus & bilateral arm skin laxity affects low self esteem and daily interactions. Also causes skin irritation. Desires surgical correction. No other pertinent medical or surgical history. No nutritional labs completed since surgery, vit/min status unknown. has script, getting completed today.  \par \par Allergy- PCN (rash)\par Non smoker\par Occ:  for [facility]

## 2023-03-06 NOTE — ASSESSMENT
[FreeTextEntry1] : 43 yo s/p robotic gastric sleeve (2/22/22), weight loss 85 lbs to BMI 39\par Weight stable 6 month.\par \par -Recommend further weight loss to BMI 30-35 before proceeding with staged brachioplasty, then panniculectomy.\par -Recommend follow up Bariatric Surgery for further weight mgmt\par -Reviewed improved post-op cosmesis with pre-op weight loss\par -All questions were answered\par -Follow up in 9 months after weight loss.\par \par Due to COVID-19, pre-visit patient instructions were explained to the patient and their symptoms were checked upon arrival. Masks were used by the healthcare provider and staff and the examination room was cleaned after the patient visit concluded\par \par

## 2023-03-06 NOTE — PHYSICAL EXAM
[de-identified] : Well developed, well nourished in NAD\par  [de-identified] : Atraumatic, normocephalic\par  [de-identified] : Supple [de-identified] : Non labored [de-identified] : JOVANR [de-identified] : Abd soft non tender non distended  [de-identified] : Circumferential abdominal skin laxity, with grade 1 anterior abd pannus. No active rashes or irritation noted. No palpable hernia present  [de-identified] : FROM. 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 Measurements deferred given significant residual adiposity.

## 2023-05-05 ENCOUNTER — APPOINTMENT (OUTPATIENT)
Dept: UROGYNECOLOGY | Facility: CLINIC | Age: 43
End: 2023-05-05

## 2023-07-07 ENCOUNTER — APPOINTMENT (OUTPATIENT)
Dept: UROGYNECOLOGY | Facility: CLINIC | Age: 43
End: 2023-07-07

## 2023-08-11 ENCOUNTER — APPOINTMENT (OUTPATIENT)
Dept: SURGERY | Facility: CLINIC | Age: 43
End: 2023-08-11

## 2023-12-04 ENCOUNTER — APPOINTMENT (OUTPATIENT)
Dept: PLASTIC SURGERY | Facility: CLINIC | Age: 43
End: 2023-12-04

## 2023-12-11 ENCOUNTER — APPOINTMENT (OUTPATIENT)
Dept: PLASTIC SURGERY | Facility: CLINIC | Age: 43
End: 2023-12-11

## 2024-05-02 NOTE — BRIEF OPERATIVE NOTE - NSICDXBRIEFPOSTOP_GEN_ALL_CORE_FT
Stop incruse     Start trelegy - 1 puff daily- rinse mouth after     Albuterol 2 puffs every 6 hrs- AS NEEDED ONLY     Start using cpap    3-4 month follow up    [facility]- schedule an appointment with a respiratory therapist [phone] or [phone]    
POST-OP DIAGNOSIS:  Class 3 severe obesity in adult 22-Feb-2022 17:30:04  Evelyn Peña

## 2024-07-15 ENCOUNTER — APPOINTMENT (OUTPATIENT)
Dept: PLASTIC SURGERY | Facility: CLINIC | Age: 44
End: 2024-07-15

## 2025-06-06 NOTE — H&P PST ADULT - ALCOHOL USE HISTORY SINGLE SELECT
[FreeTextEntry1] : Right hemicolectomy with microwave ablation of segment 3 liver lesion 04/26/2025 by Dr. Cardenas presented with pelvic incision site serous drainage  Plan:  -Change Non adhesive gauze as needed  -Continue treatment with Dr. Webster -Return in 3 months.   I have discussed the diagnosis, therapeutic plan and options with the patient at length. Patient expressed verbal understanding to proceed with proposed plan. All questions answered.
never